# Patient Record
Sex: FEMALE | Race: WHITE | NOT HISPANIC OR LATINO | Employment: UNEMPLOYED | URBAN - METROPOLITAN AREA
[De-identification: names, ages, dates, MRNs, and addresses within clinical notes are randomized per-mention and may not be internally consistent; named-entity substitution may affect disease eponyms.]

---

## 2019-01-30 ENCOUNTER — HOSPITAL ENCOUNTER (OUTPATIENT)
Dept: RADIOLOGY | Facility: MEDICAL CENTER | Age: 59
End: 2019-01-30

## 2019-02-12 ENCOUNTER — HOSPITAL ENCOUNTER (OUTPATIENT)
Dept: RADIOLOGY | Facility: MEDICAL CENTER | Age: 59
End: 2019-02-12
Attending: SURGERY
Payer: MEDICAID

## 2019-02-12 DIAGNOSIS — Z01.812 PRE-OPERATIVE LABORATORY EXAMINATION: ICD-10-CM

## 2019-02-12 DIAGNOSIS — D05.11 DUCTAL CARCINOMA IN SITU (DCIS) OF RIGHT BREAST: ICD-10-CM

## 2019-02-12 DIAGNOSIS — Z01.810 PRE-OPERATIVE CARDIOVASCULAR EXAMINATION: ICD-10-CM

## 2019-02-12 LAB
ALBUMIN SERPL BCP-MCNC: 4.3 G/DL (ref 3.2–4.9)
ALBUMIN/GLOB SERPL: 1.1 G/DL
ALP SERPL-CCNC: 68 U/L (ref 30–99)
ALT SERPL-CCNC: 51 U/L (ref 2–50)
ANION GAP SERPL CALC-SCNC: 7 MMOL/L (ref 0–11.9)
AST SERPL-CCNC: 47 U/L (ref 12–45)
BASOPHILS # BLD AUTO: 0.7 % (ref 0–1.8)
BASOPHILS # BLD: 0.03 K/UL (ref 0–0.12)
BILIRUB SERPL-MCNC: 0.7 MG/DL (ref 0.1–1.5)
BUN SERPL-MCNC: 9 MG/DL (ref 8–22)
CALCIUM SERPL-MCNC: 9.5 MG/DL (ref 8.5–10.5)
CHLORIDE SERPL-SCNC: 105 MMOL/L (ref 96–112)
CO2 SERPL-SCNC: 27 MMOL/L (ref 20–33)
CREAT SERPL-MCNC: 0.7 MG/DL (ref 0.5–1.4)
EOSINOPHIL # BLD AUTO: 0.07 K/UL (ref 0–0.51)
EOSINOPHIL NFR BLD: 1.6 % (ref 0–6.9)
ERYTHROCYTE [DISTWIDTH] IN BLOOD BY AUTOMATED COUNT: 43.6 FL (ref 35.9–50)
GLOBULIN SER CALC-MCNC: 3.8 G/DL (ref 1.9–3.5)
GLUCOSE SERPL-MCNC: 108 MG/DL (ref 65–99)
HCT VFR BLD AUTO: 45.7 % (ref 37–47)
HGB BLD-MCNC: 16.2 G/DL (ref 12–16)
IMM GRANULOCYTES # BLD AUTO: 0.01 K/UL (ref 0–0.11)
IMM GRANULOCYTES NFR BLD AUTO: 0.2 % (ref 0–0.9)
INR PPP: 0.94 (ref 0.87–1.13)
LYMPHOCYTES # BLD AUTO: 2.02 K/UL (ref 1–4.8)
LYMPHOCYTES NFR BLD: 45.2 % (ref 22–41)
MCH RBC QN AUTO: 34.5 PG (ref 27–33)
MCHC RBC AUTO-ENTMCNC: 35.4 G/DL (ref 33.6–35)
MCV RBC AUTO: 97.2 FL (ref 81.4–97.8)
MONOCYTES # BLD AUTO: 0.44 K/UL (ref 0–0.85)
MONOCYTES NFR BLD AUTO: 9.8 % (ref 0–13.4)
NEUTROPHILS # BLD AUTO: 1.9 K/UL (ref 2–7.15)
NEUTROPHILS NFR BLD: 42.5 % (ref 44–72)
NRBC # BLD AUTO: 0 K/UL
NRBC BLD-RTO: 0 /100 WBC
PLATELET # BLD AUTO: 148 K/UL (ref 164–446)
PMV BLD AUTO: 11.4 FL (ref 9–12.9)
POTASSIUM SERPL-SCNC: 4.7 MMOL/L (ref 3.6–5.5)
PROT SERPL-MCNC: 8.1 G/DL (ref 6–8.2)
PROTHROMBIN TIME: 12.7 SEC (ref 12–14.6)
RBC # BLD AUTO: 4.7 M/UL (ref 4.2–5.4)
SODIUM SERPL-SCNC: 139 MMOL/L (ref 135–145)
WBC # BLD AUTO: 4.5 K/UL (ref 4.8–10.8)

## 2019-02-12 PROCEDURE — 85610 PROTHROMBIN TIME: CPT

## 2019-02-12 PROCEDURE — 85025 COMPLETE CBC W/AUTO DIFF WBC: CPT

## 2019-02-12 PROCEDURE — 80053 COMPREHEN METABOLIC PANEL: CPT

## 2019-02-12 PROCEDURE — 36415 COLL VENOUS BLD VENIPUNCTURE: CPT

## 2019-02-12 PROCEDURE — 19281 PERQ DEVICE BREAST 1ST IMAG: CPT | Mod: RT

## 2019-02-12 RX ORDER — VARENICLINE TARTRATE 1 MG/1
1 TABLET, FILM COATED ORAL 2 TIMES DAILY
COMMUNITY

## 2019-02-14 ENCOUNTER — HOSPITAL ENCOUNTER (OUTPATIENT)
Facility: MEDICAL CENTER | Age: 59
End: 2019-02-14
Attending: SURGERY | Admitting: SURGERY
Payer: MEDICAID

## 2019-02-14 ENCOUNTER — APPOINTMENT (OUTPATIENT)
Dept: RADIOLOGY | Facility: MEDICAL CENTER | Age: 59
End: 2019-02-14
Attending: SURGERY
Payer: MEDICAID

## 2019-02-14 VITALS
TEMPERATURE: 98 F | WEIGHT: 185.85 LBS | DIASTOLIC BLOOD PRESSURE: 86 MMHG | SYSTOLIC BLOOD PRESSURE: 133 MMHG | HEART RATE: 82 BPM | RESPIRATION RATE: 16 BRPM | HEIGHT: 64 IN | OXYGEN SATURATION: 92 % | BODY MASS INDEX: 31.73 KG/M2

## 2019-02-14 DIAGNOSIS — D05.11 DUCTAL CARCINOMA IN SITU (DCIS) OF RIGHT BREAST: ICD-10-CM

## 2019-02-14 LAB — PATHOLOGY CONSULT NOTE: NORMAL

## 2019-02-14 PROCEDURE — 700111 HCHG RX REV CODE 636 W/ 250 OVERRIDE (IP)

## 2019-02-14 PROCEDURE — 501445 HCHG STAPLER, SKIN DISP: Performed by: SURGERY

## 2019-02-14 PROCEDURE — A6403 STERILE GAUZE>16 <= 48 SQ IN: HCPCS | Performed by: SURGERY

## 2019-02-14 PROCEDURE — A6402 STERILE GAUZE <= 16 SQ IN: HCPCS | Performed by: SURGERY

## 2019-02-14 PROCEDURE — 160041 HCHG SURGERY MINUTES - EA ADDL 1 MIN LEVEL 4: Performed by: SURGERY

## 2019-02-14 PROCEDURE — 501838 HCHG SUTURE GENERAL: Performed by: SURGERY

## 2019-02-14 PROCEDURE — 700101 HCHG RX REV CODE 250

## 2019-02-14 PROCEDURE — 500447 HCHG DRESSING, TEGADERM 8X12: Performed by: SURGERY

## 2019-02-14 PROCEDURE — 88307 TISSUE EXAM BY PATHOLOGIST: CPT | Mod: 59

## 2019-02-14 PROCEDURE — 160025 RECOVERY II MINUTES (STATS): Performed by: SURGERY

## 2019-02-14 PROCEDURE — 160046 HCHG PACU - 1ST 60 MINS PHASE II: Performed by: SURGERY

## 2019-02-14 PROCEDURE — 160048 HCHG OR STATISTICAL LEVEL 1-5: Performed by: SURGERY

## 2019-02-14 PROCEDURE — 160009 HCHG ANES TIME/MIN: Performed by: SURGERY

## 2019-02-14 PROCEDURE — 160035 HCHG PACU - 1ST 60 MINS PHASE I: Performed by: SURGERY

## 2019-02-14 PROCEDURE — 160002 HCHG RECOVERY MINUTES (STAT): Performed by: SURGERY

## 2019-02-14 PROCEDURE — 88341 IMHCHEM/IMCYTCHM EA ADD ANTB: CPT | Mod: XU

## 2019-02-14 PROCEDURE — 76098 X-RAY EXAM SURGICAL SPECIMEN: CPT | Mod: RT

## 2019-02-14 PROCEDURE — 88342 IMHCHEM/IMCYTCHM 1ST ANTB: CPT | Mod: XU

## 2019-02-14 PROCEDURE — A9270 NON-COVERED ITEM OR SERVICE: HCPCS | Performed by: ANESTHESIOLOGY

## 2019-02-14 PROCEDURE — 700102 HCHG RX REV CODE 250 W/ 637 OVERRIDE(OP): Performed by: ANESTHESIOLOGY

## 2019-02-14 PROCEDURE — 160029 HCHG SURGERY MINUTES - 1ST 30 MINS LEVEL 4: Performed by: SURGERY

## 2019-02-14 PROCEDURE — 160036 HCHG PACU - EA ADDL 30 MINS PHASE I: Performed by: SURGERY

## 2019-02-14 PROCEDURE — 88360 TUMOR IMMUNOHISTOCHEM/MANUAL: CPT | Mod: 91

## 2019-02-14 RX ORDER — HYDROMORPHONE HYDROCHLORIDE 1 MG/ML
0.4 INJECTION, SOLUTION INTRAMUSCULAR; INTRAVENOUS; SUBCUTANEOUS
Status: DISCONTINUED | OUTPATIENT
Start: 2019-02-14 | End: 2019-02-14 | Stop reason: HOSPADM

## 2019-02-14 RX ORDER — SODIUM CHLORIDE, SODIUM LACTATE, POTASSIUM CHLORIDE, CALCIUM CHLORIDE 600; 310; 30; 20 MG/100ML; MG/100ML; MG/100ML; MG/100ML
INJECTION, SOLUTION INTRAVENOUS ONCE
Status: COMPLETED | OUTPATIENT
Start: 2019-02-14 | End: 2019-02-14

## 2019-02-14 RX ORDER — OXYCODONE HCL 5 MG/5 ML
10 SOLUTION, ORAL ORAL
Status: DISCONTINUED | OUTPATIENT
Start: 2019-02-14 | End: 2019-02-14 | Stop reason: HOSPADM

## 2019-02-14 RX ORDER — HYDRALAZINE HYDROCHLORIDE 20 MG/ML
5 INJECTION INTRAMUSCULAR; INTRAVENOUS
Status: DISCONTINUED | OUTPATIENT
Start: 2019-02-14 | End: 2019-02-14 | Stop reason: HOSPADM

## 2019-02-14 RX ORDER — CELECOXIB 200 MG/1
400 CAPSULE ORAL ONCE
Status: COMPLETED | OUTPATIENT
Start: 2019-02-14 | End: 2019-02-14

## 2019-02-14 RX ORDER — GABAPENTIN 300 MG/1
300 CAPSULE ORAL ONCE
Status: COMPLETED | OUTPATIENT
Start: 2019-02-14 | End: 2019-02-14

## 2019-02-14 RX ORDER — BUPIVACAINE HYDROCHLORIDE AND EPINEPHRINE 5; 5 MG/ML; UG/ML
INJECTION, SOLUTION EPIDURAL; INTRACAUDAL; PERINEURAL
Status: DISCONTINUED
Start: 2019-02-14 | End: 2019-02-14 | Stop reason: HOSPADM

## 2019-02-14 RX ORDER — ACETAMINOPHEN 500 MG
1000 TABLET ORAL ONCE
Status: COMPLETED | OUTPATIENT
Start: 2019-02-14 | End: 2019-02-14

## 2019-02-14 RX ORDER — SODIUM CHLORIDE, SODIUM LACTATE, POTASSIUM CHLORIDE, AND CALCIUM CHLORIDE .6; .31; .03; .02 G/100ML; G/100ML; G/100ML; G/100ML
500 INJECTION, SOLUTION INTRAVENOUS ONCE
Status: DISCONTINUED | OUTPATIENT
Start: 2019-02-14 | End: 2019-02-14 | Stop reason: HOSPADM

## 2019-02-14 RX ORDER — HYDROMORPHONE HYDROCHLORIDE 1 MG/ML
0.2 INJECTION, SOLUTION INTRAMUSCULAR; INTRAVENOUS; SUBCUTANEOUS
Status: DISCONTINUED | OUTPATIENT
Start: 2019-02-14 | End: 2019-02-14 | Stop reason: HOSPADM

## 2019-02-14 RX ORDER — HYDROCODONE BITARTRATE AND ACETAMINOPHEN 5; 325 MG/1; MG/1
1-2 TABLET ORAL EVERY 4 HOURS PRN
Qty: 14 TAB | Refills: 0 | Status: SHIPPED | OUTPATIENT
Start: 2019-02-14 | End: 2019-02-17

## 2019-02-14 RX ORDER — SODIUM CHLORIDE, SODIUM LACTATE, POTASSIUM CHLORIDE, CALCIUM CHLORIDE 600; 310; 30; 20 MG/100ML; MG/100ML; MG/100ML; MG/100ML
INJECTION, SOLUTION INTRAVENOUS CONTINUOUS
Status: DISCONTINUED | OUTPATIENT
Start: 2019-02-14 | End: 2019-02-14 | Stop reason: HOSPADM

## 2019-02-14 RX ORDER — BUPIVACAINE HYDROCHLORIDE AND EPINEPHRINE 5; 5 MG/ML; UG/ML
INJECTION, SOLUTION EPIDURAL; INTRACAUDAL; PERINEURAL
Status: DISCONTINUED | OUTPATIENT
Start: 2019-02-14 | End: 2019-02-14 | Stop reason: HOSPADM

## 2019-02-14 RX ORDER — ONDANSETRON 2 MG/ML
4 INJECTION INTRAMUSCULAR; INTRAVENOUS
Status: DISCONTINUED | OUTPATIENT
Start: 2019-02-14 | End: 2019-02-14 | Stop reason: HOSPADM

## 2019-02-14 RX ORDER — OXYCODONE HCL 5 MG/5 ML
5 SOLUTION, ORAL ORAL
Status: DISCONTINUED | OUTPATIENT
Start: 2019-02-14 | End: 2019-02-14 | Stop reason: HOSPADM

## 2019-02-14 RX ORDER — HYDROMORPHONE HYDROCHLORIDE 1 MG/ML
0.1 INJECTION, SOLUTION INTRAMUSCULAR; INTRAVENOUS; SUBCUTANEOUS
Status: DISCONTINUED | OUTPATIENT
Start: 2019-02-14 | End: 2019-02-14 | Stop reason: HOSPADM

## 2019-02-14 RX ORDER — DIPHENHYDRAMINE HYDROCHLORIDE 50 MG/ML
12.5 INJECTION INTRAMUSCULAR; INTRAVENOUS
Status: DISCONTINUED | OUTPATIENT
Start: 2019-02-14 | End: 2019-02-14 | Stop reason: HOSPADM

## 2019-02-14 RX ORDER — MEPERIDINE HYDROCHLORIDE 25 MG/ML
12.5 INJECTION INTRAMUSCULAR; INTRAVENOUS; SUBCUTANEOUS
Status: DISCONTINUED | OUTPATIENT
Start: 2019-02-14 | End: 2019-02-14 | Stop reason: HOSPADM

## 2019-02-14 RX ADMIN — SODIUM CHLORIDE, SODIUM LACTATE, POTASSIUM CHLORIDE, CALCIUM CHLORIDE: 600; 310; 30; 20 INJECTION, SOLUTION INTRAVENOUS at 08:15

## 2019-02-14 RX ADMIN — CELECOXIB 400 MG: 200 CAPSULE ORAL at 08:27

## 2019-02-14 RX ADMIN — ACETAMINOPHEN 1000 MG: 500 TABLET, FILM COATED ORAL at 08:27

## 2019-02-14 RX ADMIN — GABAPENTIN 300 MG: 300 CAPSULE ORAL at 08:27

## 2019-02-14 NOTE — OR SURGEON
Immediate Post OP Note    PreOp Diagnosis: Ductal Carcinoma In Situ of the Right Breast    PostOp Diagnosis: Same    Procedure(s):  RIGHT LUMPECTOMY WITH CLEOPATRA  LOCALIZATION   - Wound Class: Clean    Surgeon(s):  Wilfred Holden M.D.    Assist:  JED Santiago    Anesthesiologist/Type of Anesthesia:  Anesthesiologist: Andrea Farmer M.D./General    Surgical Staff:  Circulator: Marcelle Baeza R.N.; Lisa Mendenhall R.N.  Scrub Person: Conner Jeff    Specimens removed if any:  ID Type Source Tests Collected by Time Destination   A : copy to angel Lr. Right breast lumpectomy. dx breast cancer  Tissue Breast PATHOLOGY SPECIMEN Wilfred Holden M.D. 2/14/2019  9:13 AM    B : right breast lumpectomy- superior- suture marks new margain Tissue Breast PATHOLOGY SPECIMEN Wilfred Holden M.D. 2/14/2019  9:27 AM    C : right breast superior- suture marks new margain Tissue Breast PATHOLOGY SPECIMEN Wilfred Holden M.D. 2/14/2019  9:30 AM    D : right breast inferior- suture marks new margain Tissue Breast PATHOLOGY SPECIMEN Wilfred Holden M.D. 2/14/2019  9:30 AM    E : righ breast deep-suture marks new [place] Tissue Breast PATHOLOGY SPECIMEN Wilfred Holden M.D. 2/14/2019  9:31 AM    F : right breast lateral- suture marks new margain Tissue Breast PATHOLOGY SPECIMEN Wilfred Holden M.D. 2/14/2019  9:31 AM        Estimated Blood Loss: 5 mL    Findings: Breast lump with cleopatra  inside plus additional clip and additional margins excised from right breast in the upper lateral quadrant.     Complications: No apparent complications.        2/14/2019 9:58 AM Wilfred Holden M.D.

## 2019-02-14 NOTE — OR NURSING
0952-Received from OR via [facility]. S/P Right mastectomy. Pressure dressing intact.  Bedside report received from RN. And Anesthesiologist.    1030-sleeping soundly. 02 at 2L. Patient has sleep apnea.     1125- no change. Resting soundly with eyes closed.02 sats drop due to sleep apnea. sats up to 96% in between apnea. Drops down to 84% then up to 96%.    1230-waking up. Water given. Son and daughter in law brought to bedside.02 removed. Will watch 02 sats.    1315-meets discharge criteria. Iv removed. Instructions explained to son and daughter in law.  All questions answered. Discharged home with responsible party. Escorted to car via wheelchair.

## 2019-02-14 NOTE — DISCHARGE INSTRUCTIONS
ACTIVITY: Rest and take it easy for the first 24 hours.  A responsible adult is recommended to remain with you during that time.  It is normal to feel sleepy.  We encourage you to not do anything that requires balance, judgment or coordination.    MILD FLU-LIKE SYMPTOMS ARE NORMAL. YOU MAY EXPERIENCE GENERALIZED MUSCLE ACHES, THROAT IRRITATION, HEADACHE AND/OR SOME NAUSEA.    FOR 24 HOURS DO NOT:  Drive, operate machinery or run household appliances.  Drink beer or alcoholic beverages.   Make important decisions or sign legal documents.    SPECIAL INSTRUCTIONS: keep dressing clean and dry until tomorrow. Leave steri strips in place. They will fall off as you heal. Avoid heavy lifting. Limit upper arm lifting, pushing or pulling.    DIET: To avoid nausea, slowly advance diet as tolerated, avoiding spicy or greasy foods for the first day.  Add more substantial food to your diet according to your physician's instructions.  Babies can be fed formula or breast milk as soon as they are hungry.  INCREASE FLUIDS AND FIBER TO AVOID CONSTIPATION    FOLLOW-UP APPOINTMENT:  A follow-up appointment should be arranged with your doctor in two weeks; call to schedule.    You should CALL YOUR PHYSICIAN if you develop:  Fever greater than 101 degrees F.  Pain not relieved by medication, or persistent nausea or vomiting.  Excessive bleeding (blood soaking through dressing) or unexpected drainage from the wound.  Extreme redness or swelling around the incision site, drainage of pus or foul smelling drainage.  Inability to urinate or empty your bladder within 8 hours.  Problems with breathing or chest pain.    You should call 911 if you develop problems with breathing or chest pain.  If you are unable to contact your doctor or surgical center, you should go to the nearest emergency room or urgent care center.  Physician's telephone #: [phone]    If any questions arise, call your doctor.  If your doctor is not available, please  feel free to call the Surgical Center at [phone].  The Center is open Monday through Friday from 7AM to 7PM.  You can also call the HEALTH HOTLINE open 24 hours/day, 7 days/week and speak to a nurse at [phone], or toll free at [phone].    A registered nurse may call you a few days after your surgery to see how you are doing after your procedure.    MEDICATIONS: Resume taking daily medication.  Take prescribed pain medication with food.  If no medication is prescribed, you may take non-aspirin pain medication if needed.  PAIN MEDICATION CAN BE VERY CONSTIPATING.  Take a stool softener or laxative such as senokot, pericolace, or milk of magnesia if needed.    Prescription given for NORCO.  Last pain medication given at _______    If your physician has prescribed pain medication that includes Acetaminophen (Tylenol), do not take additional Acetaminophen (Tylenol) while taking the prescribed medication.    Depression / Suicide Risk    As you are discharged from this [facility] facility, it is important to learn how to keep safe from harming yourself.    Recognize the warning signs:  · Abrupt changes in personality, positive or negative- including increase in energy   · Giving away possessions  · Change in eating patterns- significant weight changes-  positive or negative  · Change in sleeping patterns- unable to sleep or sleeping all the time   · Unwillingness or inability to communicate  · Depression  · Unusual sadness, discouragement and loneliness  · Talk of wanting to die  · Neglect of personal appearance   · Rebelliousness- reckless behavior  · Withdrawal from people/activities they love  · Confusion- inability to concentrate     If you or a loved one observes any of these behaviors or has concerns about self-harm, here's what you can do:  · Talk about it- your feelings and reasons for harming yourself  · Remove any means that you might use to hurt yourself (examples: pills, rope, extension  cords, firearm)  · Get professional help from the community (Mental Health, Substance Abuse, psychological counseling)  · Do not be alone:Call your Safe Contact- someone whom you trust who will be there for you.  · Call your local CRISIS HOTLINE 658-3638 or 905-168-0610  · Call your local Children's Mobile Crisis Response Team [place] (646) 065-9635 or www.[employer]  · Call the toll free National Suicide Prevention Hotlines   · National Suicide Prevention Lifeline 429-821-ZNXV (9575)  · National Hope Line Network 800-SUICIDE (442-1671)

## 2019-02-20 NOTE — OP REPORT
DATE OF SERVICE:  02/14/2019    SURGEON PRESENT:  Wilfred Holden MD    ANESTHESIOLOGIST:  Andrea Farmer MD    TYPE OF ANESTHETIC:  General anesthesia using laryngeal mask airway plus local   0.5% Marcaine with epinephrine.    PREOPERATIVE DIAGNOSIS:  Right breast ductal carcinoma in situ.    POSTOPERATIVE DIAGNOSIS:  Right breast ductal carcinoma in situ.    PROCEDURE:  Right partial mastectomy.    FINDINGS:  The patient is a 58-year-old female who has been referred from the   office of Dr. Lr for an abnormal screening mammogram and then diagnostic   mammogram in 12/2018.  This revealed a cluster of microcalcifications in the   upper outer quadrant of the right breast.  Biopsy of this area revealed   histopathology consistent with ductal carcinoma in situ, no invasive cancer   was found.  Options were discussed with the patient and she elected to proceed   with a partial mastectomy on the right side to be followed by radiation   therapy.  This was performed today without apparent complication.    FINDINGS AND PROCEDURE:  The patient was brought to the operating room and   placed on table in supine position.  General anesthesia was then provided by   the anesthesiologist.  The right breast area was then prepped and draped in   usual sterile fashion.  Time-out was called.  The correct patient, correct   diagnosis, correct surgery, and correct location of the surgery were discussed   and agreed upon.  The patient had a Pamela  placed prior to the surgery   with the Pamela  in the area of the previous biopsy close to the biopsy   clip.  Films were reviewed to confirm adequate and proper location.  The Pamela    device to locate the transistor, was then utilized to choose the area   for the incision.  An elliptical incision made directly over this area with   #15 blade through the skin and subcutaneous tissue.  This was done in the   upper outer quadrant of the right breast.  Dissection was then  carried down   into the subcutaneous tissue using the Pamela  location device.  A 1 cm   margin was obtained around the Pamela  lesion and extended out several   centimeters as the lesion was approached.  Entire specimen was removed.  The   specimen radiograph revealed the complications and the clip and the transistor   were all within the resected specimen.  This was sent to pathology after it   was oriented for further evaluation.  Additional margins were then taken.    Additional superior, inferior, medial, lateral and deep margin were taken and   the new margins were marked with sutures, each margins about 2x2x1 cm in size.    The cavity there was left was then irrigated and then injected with 0.5%   Marcaine with epinephrine.  The breast tissue was then reapproximated loosely   with 3-0 Vicryl suture.  The dermis was closed using running 3-0 Vicryl   suture.  The skin was closed using running 4-0 Monocryl suture in a   subcuticular fashion.  Steri-Strips and sterile dressing were applied.  An Ace   wrap was applied.  Again, the patient tolerated well and was transferred back   to recovery room for further postoperative care.       ____________________________________     MD ARETHA PORTILLOH / NTS    DD:  02/19/2019 21:36:31  DT:  02/20/2019 00:31:08    D#:  [id number]  Job#:  [id number]    cc: Shannan Lr MD

## 2019-03-11 ENCOUNTER — HOSPITAL ENCOUNTER (OUTPATIENT)
Dept: RADIATION ONCOLOGY | Facility: MEDICAL CENTER | Age: 59
End: 2019-03-31
Attending: RADIOLOGY
Payer: MEDICAID

## 2019-03-11 VITALS
SYSTOLIC BLOOD PRESSURE: 124 MMHG | HEIGHT: 64 IN | WEIGHT: 185.8 LBS | BODY MASS INDEX: 31.72 KG/M2 | HEART RATE: 72 BPM | TEMPERATURE: 98.2 F | OXYGEN SATURATION: 97 % | DIASTOLIC BLOOD PRESSURE: 62 MMHG

## 2019-03-11 PROCEDURE — 99214 OFFICE O/P EST MOD 30 MIN: CPT | Performed by: RADIOLOGY

## 2019-03-11 PROCEDURE — 99205 OFFICE O/P NEW HI 60 MIN: CPT | Performed by: RADIOLOGY

## 2019-03-11 ASSESSMENT — PAIN SCALES - GENERAL: PAINLEVEL: NO PAIN

## 2019-03-11 NOTE — CONSULTS
RADIATION ONCOLOGY CONSULT    DATE OF SERVICE: 3/11/2019    IDENTIFICATION: A 59 y.o. female with intermediate grade DCIS status post lumpectomy with negative margins..  She is here at the kind request of Dr. Carlos Holden.      HISTORY OF PRESENT ILLNESS: Patient's history dates back to 2018 when on routine mammogram she was noted to have microcalcifications in the upper outer quadrant of the right breast.  She then underwent a diagnostic mammogram and ultrasound on 2018.  This confirms a worrisome calcifications and on ultrasound the abnormality measured 12 x 2.5 mm.  She then underwent a biopsy on 1/3/2019 and this revealed a solid intermediate grade DCIS with comedonecrosis.  She subsequently underwent an excisional biopsy 2019 this confirmed the intermediate grade ductal carcinoma in situ 1.8 cm in greatest dimension margins were negative the closest margin was a 2 mm superior margin.  Tumor was estrogen positive at  postoperatively she has been doing well she is seen in consultation about the role of radiation therapy to decrease her chance of local its    PAST MEDICAL HISTORY:   Past Medical History:   Diagnosis Date   • Arthritis    • Cancer (HCC) 2019    breast cancer   • Heart burn    • Pain     joints feet   • Psychiatric problem     depression   • Snoring        PAST SURGICAL HISTORY:  Past Surgical History:   Procedure Laterality Date   • MASTECTOMY Right 2019    Procedure: MASTECTOMY- BREAST  ;  Surgeon: Wilfred Holden M.D.;  Location: SURGERY SAME DAY [facility];  Service: General   • LUMPECTOMY Right 2019    Procedure: LUMPECTOMY WITH CELESTE  LOCALIZATION  ;  Surgeon: Wilfred Holden M.D.;  Location: SURGERY SAME DAY [facility];  Service: General   • APPENDECTOMY     • CHOLECYSTECTOMY     • OTHER      bx of thyroid mass       GYNECOLOGICAL STATUS:     & Para:   : 2, Para: 1, Number of Interrupted Pregnancies: 1 and Age at first birth:  "19    Menopause Status: Post    Gynecological comments: BCP:2 years                                             HRT: 0 years            CURRENT MEDICATIONS:  Current Outpatient Prescriptions   Medication Sig Dispense Refill   • varenicline (CHANTIX) 1 MG tablet Take 1 mg by mouth 2 times a day.     • Acetaminophen (TYLENOL PO) Take  by mouth as needed.     • Ibuprofen (ADVIL PO) Take  by mouth as needed.     • RaNITidine HCl (ZANTAC PO) Take  by mouth as needed.       No current facility-administered medications for this encounter.        ALLERGIES:    Patient has no known allergies.    FAMILY HISTORY:    Father - pancreatic cancer, brother - liver cancer.        SOCIAL HISTORY:     reports that she has been smoking Cigarettes.  She has a 25.00 pack-year smoking history. She has never used smokeless tobacco. She reports that she drinks alcohol. She reports that she does not use drugs.   Patient is , has 1 child and lives in [place], [place]. Patient is a self-employed .      REVIEW OF SYSTEMS: Pertinent positives consist of a little bit of pain in the resected spot on the right breast.,  Fatigue, nocturia, joint pain, neuropathy in her hands but it gets better after she sees the chiropractor.  The rest of the review of systems is negative and has been reviewed by me. It is in the nursing note dated 3/11/2019 in [facility]    Pain Scale: 0-10  Pain Assessement: 0  Pain Location, Orientation and Scale: no complaints of pain.     PHYSICAL EXAM:    0= Fully active, able to carry on all pre-disease performance without restriction.  Vitals:    03/11/19 1454   BP: 124/62   Pulse: 72   Temp: 36.8 °C (98.2 °F)   SpO2: 97%   Weight: 84.3 kg (185 lb 12.8 oz)   Height: 1.626 m (5' 4\")            GENERAL: Well-appearing alert and oriented x3 in no apparent distress  Breasts: Bilaterally symmetrical they have dense fibroglandular changes and no discrete mass.  The right breast has evidence of the lumpectomy site in the upper " outer quadrant with some minimal induration beneath.  No discrete masses are appreciated in either breast or axilla.  HEENT:  Pupils are equal, round, and reactive to light.  Extraocular muscles   are intact. Sclerae nonicteric.  Conjunctivae pink.  Oral cavity, tongue   protrudes midline.   NECK:   No peripheral adenopathy of the neck, supraclavicular fossa or axillae   bilaterally.  LUNGS:  Clear to ascultation   HEART:  Regular rate and rhythm.  No murmur appreciated  ABDOMEN:  Soft. No evidence of hepatosplenomegaly.    EXTREMITIES:  Without Edema.  NEUROLOGIC:  Cranial nerves II through XII were intact. Normal stance and gait motor and sensory grossly within normal limits                IMPRESSION:    A 59 y.o. with  intermediate grade DCIS status post lumpectomy with negative margins...      RECOMMENDATIONS:   Patient understands and would like to proceed with radiation therapy.  I've described the details of radiation along with the side effects both acute and chronic, including but not exclusive to fatigue, skin reaction, local soreness, swelling, delayed healing, scarring fibrosis discoloration. Ample time was allowed for questions, and patient understands.    I have her tentatively scheduled for simulation tomorrow to get started soon thereafter.  I also instructed her to call  to talk about systemic management as well    Thank you for the opportunity to participate in her care.  If any questions or comments, please do not hesitate in calling.    Please note that this dictation was created using voice recognition software. I have made every reasonable attempt to correct obvious errors, but I expect that there are errors of grammar and possibly content that I did not discover before finalizing the note.

## 2019-03-11 NOTE — NON-PROVIDER
"Patient was seen today in clinic with Dr. Bruton for Breast Cancer.  Vitals signs and weight were obtained and pain assessment was completed.  Allergies and medications were reviewed with the patient.  Review of systems completed.     Vitals/Pain:  Vitals:    03/11/19 1454   BP: 124/62   Pulse: 72   Temp: 36.8 °C (98.2 °F)   SpO2: 97%   Weight: 84.3 kg (185 lb 12.8 oz)   Height: 1.626 m (5' 4\")   Pain Score: No pain        Allergies:   Patient has no known allergies.    Current Medications:  Current Outpatient Prescriptions   Medication Sig Dispense Refill   • varenicline (CHANTIX) 1 MG tablet Take 1 mg by mouth 2 times a day.     • Acetaminophen (TYLENOL PO) Take  by mouth as needed.     • Ibuprofen (ADVIL PO) Take  by mouth as needed.     • RaNITidine HCl (ZANTAC PO) Take  by mouth as needed.       No current facility-administered medications for this encounter.          PCP:  Adeline Schneider R.N.  "

## 2019-03-12 ENCOUNTER — HOSPITAL ENCOUNTER (OUTPATIENT)
Dept: RADIATION ONCOLOGY | Facility: MEDICAL CENTER | Age: 59
End: 2019-03-12

## 2019-03-12 PROCEDURE — 77290 THER RAD SIMULAJ FIELD CPLX: CPT | Mod: 26 | Performed by: RADIOLOGY

## 2019-03-12 PROCEDURE — 77334 RADIATION TREATMENT AID(S): CPT | Mod: 26 | Performed by: RADIOLOGY

## 2019-03-12 PROCEDURE — 77290 THER RAD SIMULAJ FIELD CPLX: CPT | Performed by: RADIOLOGY

## 2019-03-12 PROCEDURE — 77263 THER RADIOLOGY TX PLNG CPLX: CPT | Performed by: RADIOLOGY

## 2019-03-12 PROCEDURE — 77334 RADIATION TREATMENT AID(S): CPT | Performed by: RADIOLOGY

## 2019-03-20 PROCEDURE — 77334 RADIATION TREATMENT AID(S): CPT | Performed by: RADIOLOGY

## 2019-03-20 PROCEDURE — 77300 RADIATION THERAPY DOSE PLAN: CPT | Mod: 26 | Performed by: RADIOLOGY

## 2019-03-20 PROCEDURE — 77334 RADIATION TREATMENT AID(S): CPT | Mod: 26 | Performed by: RADIOLOGY

## 2019-03-20 PROCEDURE — 77300 RADIATION THERAPY DOSE PLAN: CPT | Performed by: RADIOLOGY

## 2019-03-20 PROCEDURE — 77295 3-D RADIOTHERAPY PLAN: CPT | Mod: 26 | Performed by: RADIOLOGY

## 2019-03-20 PROCEDURE — 77295 3-D RADIOTHERAPY PLAN: CPT | Performed by: RADIOLOGY

## 2019-03-25 ENCOUNTER — HOSPITAL ENCOUNTER (OUTPATIENT)
Dept: RADIATION ONCOLOGY | Facility: MEDICAL CENTER | Age: 59
End: 2019-03-25

## 2019-03-25 LAB
CHEMOTHERAPY INFUSION START DATE: NORMAL
CHEMOTHERAPY RECORDS: 2.67
CHEMOTHERAPY RECORDS: 4005
CHEMOTHERAPY RECORDS: NORMAL
CHEMOTHERAPY RX CANCER: NORMAL
RAD ONC ARIA COURSE TREATMENT ELAPSED DAYS: NORMAL
RAD ONC ARIA PLAN TREATMENT DATES: NORMAL
RAD ONC ARIA REFERENCE POINT DOSAGE GIVEN TO DATE: 2.67
RAD ONC ARIA REFERENCE POINT DOSAGE GIVEN TO DATE: 2.67
RAD ONC ARIA REFERENCE POINT ID: NORMAL
RAD ONC ARIA REFERENCE POINT ID: NORMAL
RAD ONC ARIA REFERENCE POINT SESSION DOSAGE GIVEN: 2.67
RAD ONC ARIA REFERENCE POINT SESSION DOSAGE GIVEN: 2.67

## 2019-03-25 PROCEDURE — 77412 RADIATION TX DELIVERY LVL 3: CPT | Performed by: RADIOLOGY

## 2019-03-25 PROCEDURE — 77280 THER RAD SIMULAJ FIELD SMPL: CPT | Mod: 26 | Performed by: RADIOLOGY

## 2019-03-25 PROCEDURE — 77280 THER RAD SIMULAJ FIELD SMPL: CPT | Performed by: RADIOLOGY

## 2019-03-25 PROCEDURE — 77417 THER RADIOLOGY PORT IMAGE(S): CPT | Performed by: RADIOLOGY

## 2019-03-26 ENCOUNTER — HOSPITAL ENCOUNTER (OUTPATIENT)
Dept: RADIATION ONCOLOGY | Facility: MEDICAL CENTER | Age: 59
End: 2019-03-26

## 2019-03-26 LAB
CHEMOTHERAPY INFUSION START DATE: NORMAL
CHEMOTHERAPY RECORDS: 2.67
CHEMOTHERAPY RECORDS: 4005
CHEMOTHERAPY RECORDS: NORMAL
CHEMOTHERAPY RX CANCER: NORMAL
RAD ONC ARIA COURSE TREATMENT ELAPSED DAYS: NORMAL
RAD ONC ARIA PLAN TREATMENT DATES: NORMAL
RAD ONC ARIA REFERENCE POINT DOSAGE GIVEN TO DATE: 5.34
RAD ONC ARIA REFERENCE POINT DOSAGE GIVEN TO DATE: 5.34
RAD ONC ARIA REFERENCE POINT ID: NORMAL
RAD ONC ARIA REFERENCE POINT ID: NORMAL
RAD ONC ARIA REFERENCE POINT SESSION DOSAGE GIVEN: 2.67
RAD ONC ARIA REFERENCE POINT SESSION DOSAGE GIVEN: 2.67

## 2019-03-26 PROCEDURE — 77412 RADIATION TX DELIVERY LVL 3: CPT | Performed by: RADIOLOGY

## 2019-03-27 ENCOUNTER — HOSPITAL ENCOUNTER (OUTPATIENT)
Dept: RADIATION ONCOLOGY | Facility: MEDICAL CENTER | Age: 59
End: 2019-03-27

## 2019-03-27 LAB
CHEMOTHERAPY INFUSION START DATE: NORMAL
CHEMOTHERAPY RECORDS: 2.67
CHEMOTHERAPY RECORDS: 4005
CHEMOTHERAPY RECORDS: NORMAL
CHEMOTHERAPY RX CANCER: NORMAL
RAD ONC ARIA COURSE TREATMENT ELAPSED DAYS: NORMAL
RAD ONC ARIA PLAN TREATMENT DATES: NORMAL
RAD ONC ARIA REFERENCE POINT DOSAGE GIVEN TO DATE: 8.01
RAD ONC ARIA REFERENCE POINT DOSAGE GIVEN TO DATE: 8.01
RAD ONC ARIA REFERENCE POINT ID: NORMAL
RAD ONC ARIA REFERENCE POINT ID: NORMAL
RAD ONC ARIA REFERENCE POINT SESSION DOSAGE GIVEN: 2.67
RAD ONC ARIA REFERENCE POINT SESSION DOSAGE GIVEN: 2.67

## 2019-03-27 PROCEDURE — 77412 RADIATION TX DELIVERY LVL 3: CPT | Performed by: RADIOLOGY

## 2019-03-27 PROCEDURE — 77336 RADIATION PHYSICS CONSULT: CPT | Performed by: RADIOLOGY

## 2019-03-28 ENCOUNTER — HOSPITAL ENCOUNTER (OUTPATIENT)
Dept: RADIATION ONCOLOGY | Facility: MEDICAL CENTER | Age: 59
End: 2019-03-28

## 2019-03-28 LAB
CHEMOTHERAPY INFUSION START DATE: NORMAL
CHEMOTHERAPY RECORDS: 2.67
CHEMOTHERAPY RECORDS: 4005
CHEMOTHERAPY RECORDS: NORMAL
CHEMOTHERAPY RX CANCER: NORMAL
RAD ONC ARIA COURSE TREATMENT ELAPSED DAYS: NORMAL
RAD ONC ARIA PLAN TREATMENT DATES: NORMAL
RAD ONC ARIA REFERENCE POINT DOSAGE GIVEN TO DATE: 10.68
RAD ONC ARIA REFERENCE POINT DOSAGE GIVEN TO DATE: 10.68
RAD ONC ARIA REFERENCE POINT ID: NORMAL
RAD ONC ARIA REFERENCE POINT ID: NORMAL
RAD ONC ARIA REFERENCE POINT SESSION DOSAGE GIVEN: 2.67
RAD ONC ARIA REFERENCE POINT SESSION DOSAGE GIVEN: 2.67

## 2019-03-28 PROCEDURE — 77412 RADIATION TX DELIVERY LVL 3: CPT | Performed by: RADIOLOGY

## 2019-03-29 ENCOUNTER — HOSPITAL ENCOUNTER (OUTPATIENT)
Dept: RADIATION ONCOLOGY | Facility: MEDICAL CENTER | Age: 59
End: 2019-03-29

## 2019-03-29 LAB
CHEMOTHERAPY INFUSION START DATE: NORMAL
CHEMOTHERAPY RECORDS: 2.67
CHEMOTHERAPY RECORDS: 4005
CHEMOTHERAPY RECORDS: NORMAL
CHEMOTHERAPY RX CANCER: NORMAL
RAD ONC ARIA COURSE TREATMENT ELAPSED DAYS: NORMAL
RAD ONC ARIA PLAN TREATMENT DATES: NORMAL
RAD ONC ARIA REFERENCE POINT DOSAGE GIVEN TO DATE: 13.35
RAD ONC ARIA REFERENCE POINT DOSAGE GIVEN TO DATE: 13.35
RAD ONC ARIA REFERENCE POINT ID: NORMAL
RAD ONC ARIA REFERENCE POINT ID: NORMAL
RAD ONC ARIA REFERENCE POINT SESSION DOSAGE GIVEN: 2.67
RAD ONC ARIA REFERENCE POINT SESSION DOSAGE GIVEN: 2.67

## 2019-03-29 PROCEDURE — 77427 RADIATION TX MANAGEMENT X5: CPT | Performed by: RADIOLOGY

## 2019-03-29 PROCEDURE — 77412 RADIATION TX DELIVERY LVL 3: CPT | Performed by: RADIOLOGY

## 2019-04-01 ENCOUNTER — HOSPITAL ENCOUNTER (OUTPATIENT)
Dept: RADIATION ONCOLOGY | Facility: MEDICAL CENTER | Age: 59
End: 2019-04-30
Attending: RADIOLOGY
Payer: MEDICAID

## 2019-04-01 ENCOUNTER — HOSPITAL ENCOUNTER (OUTPATIENT)
Dept: RADIATION ONCOLOGY | Facility: MEDICAL CENTER | Age: 59
End: 2019-04-01

## 2019-04-01 LAB
CHEMOTHERAPY INFUSION START DATE: NORMAL
CHEMOTHERAPY RECORDS: 2.67
CHEMOTHERAPY RECORDS: 4005
CHEMOTHERAPY RECORDS: NORMAL
CHEMOTHERAPY RX CANCER: NORMAL
RAD ONC ARIA COURSE TREATMENT ELAPSED DAYS: NORMAL
RAD ONC ARIA PLAN TREATMENT DATES: NORMAL
RAD ONC ARIA REFERENCE POINT DOSAGE GIVEN TO DATE: 16.02
RAD ONC ARIA REFERENCE POINT DOSAGE GIVEN TO DATE: 16.02
RAD ONC ARIA REFERENCE POINT ID: NORMAL
RAD ONC ARIA REFERENCE POINT ID: NORMAL
RAD ONC ARIA REFERENCE POINT SESSION DOSAGE GIVEN: 2.67
RAD ONC ARIA REFERENCE POINT SESSION DOSAGE GIVEN: 2.67

## 2019-04-01 PROCEDURE — 77412 RADIATION TX DELIVERY LVL 3: CPT | Performed by: RADIOLOGY

## 2019-04-01 PROCEDURE — 77417 THER RADIOLOGY PORT IMAGE(S): CPT | Performed by: RADIOLOGY

## 2019-04-02 ENCOUNTER — HOSPITAL ENCOUNTER (OUTPATIENT)
Dept: RADIATION ONCOLOGY | Facility: MEDICAL CENTER | Age: 59
End: 2019-04-02

## 2019-04-02 LAB
CHEMOTHERAPY INFUSION START DATE: NORMAL
CHEMOTHERAPY RECORDS: 2.67
CHEMOTHERAPY RECORDS: 4005
CHEMOTHERAPY RECORDS: NORMAL
CHEMOTHERAPY RX CANCER: NORMAL
RAD ONC ARIA COURSE TREATMENT ELAPSED DAYS: NORMAL
RAD ONC ARIA PLAN TREATMENT DATES: NORMAL
RAD ONC ARIA REFERENCE POINT DOSAGE GIVEN TO DATE: 18.69
RAD ONC ARIA REFERENCE POINT DOSAGE GIVEN TO DATE: 18.69
RAD ONC ARIA REFERENCE POINT ID: NORMAL
RAD ONC ARIA REFERENCE POINT ID: NORMAL
RAD ONC ARIA REFERENCE POINT SESSION DOSAGE GIVEN: 2.67
RAD ONC ARIA REFERENCE POINT SESSION DOSAGE GIVEN: 2.67

## 2019-04-02 PROCEDURE — 77412 RADIATION TX DELIVERY LVL 3: CPT | Performed by: RADIOLOGY

## 2019-04-03 ENCOUNTER — HOSPITAL ENCOUNTER (OUTPATIENT)
Dept: RADIATION ONCOLOGY | Facility: MEDICAL CENTER | Age: 59
End: 2019-04-03

## 2019-04-03 LAB
CHEMOTHERAPY INFUSION START DATE: NORMAL
CHEMOTHERAPY RECORDS: 2.67
CHEMOTHERAPY RECORDS: 4005
CHEMOTHERAPY RECORDS: NORMAL
CHEMOTHERAPY RX CANCER: NORMAL
RAD ONC ARIA COURSE TREATMENT ELAPSED DAYS: NORMAL
RAD ONC ARIA PLAN TREATMENT DATES: NORMAL
RAD ONC ARIA REFERENCE POINT DOSAGE GIVEN TO DATE: 21.36
RAD ONC ARIA REFERENCE POINT DOSAGE GIVEN TO DATE: 21.36
RAD ONC ARIA REFERENCE POINT ID: NORMAL
RAD ONC ARIA REFERENCE POINT ID: NORMAL
RAD ONC ARIA REFERENCE POINT SESSION DOSAGE GIVEN: 2.67
RAD ONC ARIA REFERENCE POINT SESSION DOSAGE GIVEN: 2.67

## 2019-04-03 PROCEDURE — 77336 RADIATION PHYSICS CONSULT: CPT | Performed by: RADIOLOGY

## 2019-04-03 PROCEDURE — 77412 RADIATION TX DELIVERY LVL 3: CPT | Performed by: RADIOLOGY

## 2019-04-04 ENCOUNTER — HOSPITAL ENCOUNTER (OUTPATIENT)
Dept: RADIATION ONCOLOGY | Facility: MEDICAL CENTER | Age: 59
End: 2019-04-04

## 2019-04-04 LAB
CHEMOTHERAPY INFUSION START DATE: NORMAL
CHEMOTHERAPY RECORDS: 2.67
CHEMOTHERAPY RECORDS: 4005
CHEMOTHERAPY RECORDS: NORMAL
CHEMOTHERAPY RX CANCER: NORMAL
RAD ONC ARIA COURSE TREATMENT ELAPSED DAYS: NORMAL
RAD ONC ARIA PLAN TREATMENT DATES: NORMAL
RAD ONC ARIA REFERENCE POINT DOSAGE GIVEN TO DATE: 24.03
RAD ONC ARIA REFERENCE POINT DOSAGE GIVEN TO DATE: 24.03
RAD ONC ARIA REFERENCE POINT ID: NORMAL
RAD ONC ARIA REFERENCE POINT ID: NORMAL
RAD ONC ARIA REFERENCE POINT SESSION DOSAGE GIVEN: 2.67
RAD ONC ARIA REFERENCE POINT SESSION DOSAGE GIVEN: 2.67

## 2019-04-04 PROCEDURE — 77412 RADIATION TX DELIVERY LVL 3: CPT | Performed by: RADIOLOGY

## 2019-04-05 ENCOUNTER — HOSPITAL ENCOUNTER (OUTPATIENT)
Dept: RADIATION ONCOLOGY | Facility: MEDICAL CENTER | Age: 59
End: 2019-04-05

## 2019-04-05 LAB
CHEMOTHERAPY INFUSION START DATE: NORMAL
CHEMOTHERAPY RECORDS: 2.67
CHEMOTHERAPY RECORDS: 4005
CHEMOTHERAPY RECORDS: NORMAL
CHEMOTHERAPY RX CANCER: NORMAL
RAD ONC ARIA COURSE TREATMENT ELAPSED DAYS: NORMAL
RAD ONC ARIA PLAN TREATMENT DATES: NORMAL
RAD ONC ARIA REFERENCE POINT DOSAGE GIVEN TO DATE: 26.7
RAD ONC ARIA REFERENCE POINT DOSAGE GIVEN TO DATE: 26.7
RAD ONC ARIA REFERENCE POINT ID: NORMAL
RAD ONC ARIA REFERENCE POINT ID: NORMAL
RAD ONC ARIA REFERENCE POINT SESSION DOSAGE GIVEN: 2.67
RAD ONC ARIA REFERENCE POINT SESSION DOSAGE GIVEN: 2.67

## 2019-04-05 PROCEDURE — 77412 RADIATION TX DELIVERY LVL 3: CPT | Performed by: RADIOLOGY

## 2019-04-05 PROCEDURE — 77427 RADIATION TX MANAGEMENT X5: CPT | Performed by: RADIOLOGY

## 2019-04-08 ENCOUNTER — HOSPITAL ENCOUNTER (OUTPATIENT)
Dept: RADIATION ONCOLOGY | Facility: MEDICAL CENTER | Age: 59
End: 2019-04-08

## 2019-04-08 LAB
CHEMOTHERAPY INFUSION START DATE: NORMAL
CHEMOTHERAPY RECORDS: 2.67
CHEMOTHERAPY RECORDS: 4005
CHEMOTHERAPY RECORDS: NORMAL
CHEMOTHERAPY RX CANCER: NORMAL
RAD ONC ARIA COURSE TREATMENT ELAPSED DAYS: NORMAL
RAD ONC ARIA PLAN TREATMENT DATES: NORMAL
RAD ONC ARIA REFERENCE POINT DOSAGE GIVEN TO DATE: 29.37
RAD ONC ARIA REFERENCE POINT DOSAGE GIVEN TO DATE: 29.37
RAD ONC ARIA REFERENCE POINT ID: NORMAL
RAD ONC ARIA REFERENCE POINT ID: NORMAL
RAD ONC ARIA REFERENCE POINT SESSION DOSAGE GIVEN: 2.67
RAD ONC ARIA REFERENCE POINT SESSION DOSAGE GIVEN: 2.67

## 2019-04-08 PROCEDURE — 77412 RADIATION TX DELIVERY LVL 3: CPT | Performed by: RADIOLOGY

## 2019-04-08 PROCEDURE — 77417 THER RADIOLOGY PORT IMAGE(S): CPT | Performed by: RADIOLOGY

## 2019-04-09 ENCOUNTER — HOSPITAL ENCOUNTER (OUTPATIENT)
Facility: MEDICAL CENTER | Age: 59
End: 2019-04-09
Attending: INTERNAL MEDICINE
Payer: MEDICAID

## 2019-04-09 ENCOUNTER — OFFICE VISIT (OUTPATIENT)
Dept: HEMATOLOGY ONCOLOGY | Facility: MEDICAL CENTER | Age: 59
End: 2019-04-09
Payer: MEDICAID

## 2019-04-09 ENCOUNTER — NON-PROVIDER VISIT (OUTPATIENT)
Dept: HEMATOLOGY ONCOLOGY | Facility: MEDICAL CENTER | Age: 59
End: 2019-04-09
Payer: MEDICAID

## 2019-04-09 ENCOUNTER — HOSPITAL ENCOUNTER (OUTPATIENT)
Dept: RADIATION ONCOLOGY | Facility: MEDICAL CENTER | Age: 59
End: 2019-04-09

## 2019-04-09 VITALS
SYSTOLIC BLOOD PRESSURE: 128 MMHG | HEIGHT: 63 IN | BODY MASS INDEX: 33.05 KG/M2 | HEART RATE: 80 BPM | DIASTOLIC BLOOD PRESSURE: 76 MMHG | OXYGEN SATURATION: 94 % | WEIGHT: 186.51 LBS | RESPIRATION RATE: 16 BRPM | TEMPERATURE: 99.5 F

## 2019-04-09 VITALS
SYSTOLIC BLOOD PRESSURE: 128 MMHG | TEMPERATURE: 99.5 F | OXYGEN SATURATION: 94 % | HEART RATE: 80 BPM | HEIGHT: 63 IN | DIASTOLIC BLOOD PRESSURE: 76 MMHG | RESPIRATION RATE: 16 BRPM | WEIGHT: 186 LBS | BODY MASS INDEX: 32.96 KG/M2

## 2019-04-09 DIAGNOSIS — D05.11 DUCTAL CARCINOMA IN SITU (DCIS) OF RIGHT BREAST: ICD-10-CM

## 2019-04-09 LAB
ALBUMIN SERPL BCP-MCNC: 4.1 G/DL (ref 3.2–4.9)
ALBUMIN/GLOB SERPL: 1.1 G/DL
ALP SERPL-CCNC: 63 U/L (ref 30–99)
ALT SERPL-CCNC: 57 U/L (ref 2–50)
ANION GAP SERPL CALC-SCNC: 9 MMOL/L (ref 0–11.9)
AST SERPL-CCNC: 52 U/L (ref 12–45)
BILIRUB SERPL-MCNC: 0.6 MG/DL (ref 0.1–1.5)
BUN SERPL-MCNC: 22 MG/DL (ref 8–22)
CALCIUM SERPL-MCNC: 9.2 MG/DL (ref 8.5–10.5)
CHEMOTHERAPY INFUSION START DATE: NORMAL
CHEMOTHERAPY RECORDS: 2.67
CHEMOTHERAPY RECORDS: 4005
CHEMOTHERAPY RECORDS: NORMAL
CHEMOTHERAPY RX CANCER: NORMAL
CHLORIDE SERPL-SCNC: 108 MMOL/L (ref 96–112)
CO2 SERPL-SCNC: 22 MMOL/L (ref 20–33)
CREAT SERPL-MCNC: 0.73 MG/DL (ref 0.5–1.4)
GLOBULIN SER CALC-MCNC: 3.8 G/DL (ref 1.9–3.5)
GLUCOSE SERPL-MCNC: 87 MG/DL (ref 65–99)
POTASSIUM SERPL-SCNC: 4.2 MMOL/L (ref 3.6–5.5)
PROT SERPL-MCNC: 7.9 G/DL (ref 6–8.2)
RAD ONC ARIA COURSE TREATMENT ELAPSED DAYS: NORMAL
RAD ONC ARIA PLAN TREATMENT DATES: NORMAL
RAD ONC ARIA REFERENCE POINT DOSAGE GIVEN TO DATE: 32.04
RAD ONC ARIA REFERENCE POINT DOSAGE GIVEN TO DATE: 32.04
RAD ONC ARIA REFERENCE POINT ID: NORMAL
RAD ONC ARIA REFERENCE POINT ID: NORMAL
RAD ONC ARIA REFERENCE POINT SESSION DOSAGE GIVEN: 2.67
RAD ONC ARIA REFERENCE POINT SESSION DOSAGE GIVEN: 2.67
SODIUM SERPL-SCNC: 139 MMOL/L (ref 135–145)

## 2019-04-09 PROCEDURE — 99204 OFFICE O/P NEW MOD 45 MIN: CPT | Performed by: INTERNAL MEDICINE

## 2019-04-09 PROCEDURE — 80053 COMPREHEN METABOLIC PANEL: CPT

## 2019-04-09 PROCEDURE — 77412 RADIATION TX DELIVERY LVL 3: CPT | Performed by: RADIOLOGY

## 2019-04-09 PROCEDURE — 36415 COLL VENOUS BLD VENIPUNCTURE: CPT | Performed by: INTERNAL MEDICINE

## 2019-04-09 RX ORDER — ANASTROZOLE 1 MG/1
1 TABLET ORAL DAILY
Qty: 30 TAB | Refills: 3 | Status: SHIPPED | OUTPATIENT
Start: 2019-04-09

## 2019-04-09 ASSESSMENT — PAIN SCALES - GENERAL
PAINLEVEL: NO PAIN
PAINLEVEL: NO PAIN

## 2019-04-09 NOTE — PROGRESS NOTES
Consult Note: Oncology    Date of consultation: 2019 2:39 PM    Referring provider: Wilfred Holden M.D.    Reason for consultation:   intermediate grade DCIS       History of presenting illness:     2018 - routine mammogram - microcalcifications in the upper outer quadrant of the right breast.    Diagnostic mammogram and ultrasound on 2018 -  calcifications and on ultrasound the abnormality measured 12 x 2.5 mm.    - biopsy on 1/3/2019  solid intermediate grade DCIS with comedonecrosis.     - s/p  excisional biopsy 2019 this confirmed the intermediate grade ductal carcinoma in situ 1.8 cm in greatest dimension margins were negative the closest margin was a 2 mm  ER/PA strongly positive.  Currently undergoing radiation.  She has some chronic arthralgias and h/o smoking .      Past Medical History:     & Para:   : 2, Para: 1, Number of Interrupted Pregnancies: 1 and Age at first birth: 19     Menopause Status: Post     Gynecological comments: BCP:2 years                                             HRT: 0 years     Past Medical History:   Diagnosis Date   • Arthritis    • Cancer (HCC) 2019    breast cancer   • Heart burn    • Pain     joints feet   • Psychiatric problem     depression   • Snoring        Past surgical history:    Past Surgical History:   Procedure Laterality Date   • MASTECTOMY Right 2019    Procedure: MASTECTOMY- BREAST  ;  Surgeon: Wilfred Holden M.D.;  Location: SURGERY SAME DAY [facility];  Service: General   • LUMPECTOMY Right 2019    Procedure: LUMPECTOMY WITH CELESTE  LOCALIZATION  ;  Surgeon: Wilfred Holden M.D.;  Location: SURGERY SAME DAY [facility];  Service: General   • APPENDECTOMY     • CHOLECYSTECTOMY     • OTHER      bx of thyroid mass       Allergies:  Patient has no known allergies.    Medications:    Current Outpatient Prescriptions   Medication Sig Dispense Refill   • varenicline (CHANTIX) 1 MG tablet Take 1 mg by mouth 2 times a  day.     • Ibuprofen (ADVIL PO) Take  by mouth as needed.     • Acetaminophen (TYLENOL PO) Take  by mouth as needed.     • RaNITidine HCl (ZANTAC PO) Take  by mouth as needed.       No current facility-administered medications for this visit.        Social History:     Social History     Social History   • Marital status:      Spouse name: N/A   • Number of children: N/A   • Years of education: N/A     Occupational History   •       Social History Main Topics   • Smoking status: Current Every Day Smoker     Packs/day: 1.00     Years: 25.00     Types: Cigarettes   • Smokeless tobacco: Never Used      Comment: currently down to 5 cigarettes per day (on Chantix)   • Alcohol use Yes      Comment: socially   • Drug use: No   • Sexual activity: Not on file     Other Topics Concern   • Not on file     Social History Narrative   • No narrative on file       Family History:     Family History   Problem Relation Age of Onset   • Cancer Mother         lung cancer   • Cancer Father         pancreatic cancer   • Cancer Brother         Liver cancer       Review of Systems:  All other review of systems are negative except what was mentioned above in the HPI.    Constitutional: No fever, chills, weight loss ,malaise/fatigue.    HEENT: No new auditory or visual complaints. No sore throat and neck pain.     Respiratory:No new cough, sputum production, shortness of breath and wheezing.    Cardiovascular: No new chest pain, palpitations, orthopnea and leg swelling.    Gastrointestinal: No heartburn, nausea, vomiting ,abdominal pain, hematochezia or melena     Genitourinary: Negative for dysuria, hematuria    Musculoskeletal: No new arthralgias or myalgias   Skin: Negative for rash and itching.    Neurological: Negative for focal weakness or headaches.    Endo/Heme/Allergies: No abnormal bleed/bruise.    Psychiatric/Behavioral: No new depression/anxiety.    Physical Exam:  Vitals:   /76 (BP Location: Left arm,  "Patient Position: Sitting, BP Cuff Size: Large adult)   Pulse 80   Temp 37.5 °C (99.5 °F) (Temporal)   Resp 16   Ht 1.594 m (5' 2.75\")   Wt 84.6 kg (186 lb 8.2 oz)   SpO2 94%   BMI 33.30 kg/m²     General: Not in acute distress, alert and oriented x 3  HEENT: No pallor, icterus. Oropharynx clear.   Neck: Supple, no palpable masses.  Lymph nodes: No palpable cervical, supraclavicular, axillary or inguinal lymphadenopathy.    CVS: regular rate and rhythm, no rubs or gallops  RESP: Clear to auscultate bilaterally, no wheezing or crackles.   ABD: Soft, non tender, non distended, positive bowel sounds, no palpable organomegaly  EXT: No edema or cyanosis  CNS: Alert and oriented x3, No focal deficits.  Skin- No rash  Breast- well healing rt lumpectomy scar with post op/ xrt changes     Labs:   No results for input(s): RBC, HEMOGLOBIN, HEMATOCRIT, PLATELETCT, PROTHROMBTM, APTT, INR, IRON, FERRITIN, TOTIRONBC in the last 72 hours.  Lab Results   Component Value Date/Time    SODIUM 139 02/12/2019 07:52 AM    POTASSIUM 4.7 02/12/2019 07:52 AM    CHLORIDE 105 02/12/2019 07:52 AM    CO2 27 02/12/2019 07:52 AM    GLUCOSE 108 (H) 02/12/2019 07:52 AM    BUN 9 02/12/2019 07:52 AM    CREATININE 0.70 02/12/2019 07:52 AM    CREATININE 0.7 07/27/2005 10:55 AM        Assessment and Plan:      Rt breast DCIS - Int grade ER/NH+   S/p excision with negative margins .   Currently undergoing xrt.  Discussed risk reduction strategy/antiestrogen.  She is postmenopausal.  Discussed the option of either tamoxifen or aromatase inhibitor.  She does have history of smoking and due to the slightly high risk of DVT , recommend aromatase inhibitor.  Will obtain a baseline bone density scan.    Informed her that antiestrogen treatment has not shown to improve survival rate in DCIS and the goal of treatment he is to reduce the risk of ipsilateral/contralateral recurrence of a new breast cancer.  She does have some arthralgias.  Discussed the " potential adverse effects of aromatase inhibitor including, but not limited to arthralgias, bone loss, hot flashes and other symptomatology associated with low estrogen status.  She will be finishing up her radiation a month and she will start Arimidex few days after that.  We will tentatively make a follow-up around 3 months from now.  She understands that she will need surveillance mammogram in the future .    She agreed and verbalized her agreement and understanding with the current plan.  I answered all questions and concerns she has at this time.              Thank you for allowing me to participate in her care.    Please note that this dictation was created using voice recognition software. I have made every reasonable attempt to correct obvious errors, but I expect that there are errors of grammar and possibly content that I did not discover before finalizing the note.      SIGNATURES:  Galo Chan    CC:  TOMMY Quintana James B, M.D.

## 2019-04-09 NOTE — PROGRESS NOTES
Consult Note: Oncology    Date of consultation: 4/9/2019 2:47 PM    Referring provider: Wilfred Holden M.D.    Reason for consultation: Left breast DCIS       History of presenting illness:     Dear  Wilfred Holden M.D.,    Thank you very much for allowing me to see  Dayana Benedict today . As you know she  is a 59 y.o. 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   • Heart burn    • Pain     joints feet   • Psychiatric problem     depression   • Snoring        Past surgical history:    Past Surgical History:   Procedure Laterality Date   • MASTECTOMY Right 2/14/2019    Procedure: MASTECTOMY- BREAST  ;  Surgeon: Wilfred Holden M.D.;  Location: SURGERY SAME DAY [facility] ORS;  Service: General   • LUMPECTOMY Right 2/14/2019    Procedure: LUMPECTOMY WITH CELESTE  LOCALIZATION  ;  Surgeon: Wilfred Holden M.D.;  Location: SURGERY SAME DAY [facility] ORS;  Service: General   • APPENDECTOMY     • CHOLECYSTECTOMY     • OTHER      bx of thyroid mass       Allergies:  Patient has no known allergies.    Medications:    Current Outpatient Prescriptions   Medication Sig Dispense Refill   • varenicline (CHANTIX) 1 MG tablet Take 1 mg by mouth 2 times a day.     • Ibuprofen (ADVIL PO) Take  by mouth as needed.     • Acetaminophen (TYLENOL PO) Take  by mouth as needed.     • RaNITidine HCl (ZANTAC PO) Take  by mouth as needed.       No current facility-administered medications for this visit.        Social History:     Social History     Social History   • Marital status:      Spouse name: N/A   • Number of children: N/A   • Years of education: N/A     Occupational History   •       Social History Main Topics   • Smoking status: Current Every Day Smoker     Packs/day: 1.00     Years: 25.00     Types: Cigarettes   • Smokeless tobacco: Never Used       "Comment: currently down to 5 cigarettes per day (on Chantix)   • Alcohol use Yes      Comment: socially   • Drug use: No   • Sexual activity: Not on file     Other Topics Concern   • Not on file     Social History Narrative   • No narrative on file       Family History:     Family History   Problem Relation Age of Onset   • Cancer Mother         lung cancer   • Cancer Father         pancreatic cancer   • Cancer Brother         Liver cancer       Review of Systems:  All other review of systems are negative except what was mentioned above in the HPI.    Constitutional: No fever, chills, weight loss ,malaise/fatigue.    HEENT: No new auditory or visual complaints. No sore throat and neck pain.     Respiratory:No new cough, sputum production, shortness of breath and wheezing.    Cardiovascular: No new chest pain, palpitations, orthopnea and leg swelling.    Gastrointestinal: No heartburn, nausea, vomiting ,abdominal pain, hematochezia or melena     Genitourinary: Negative for dysuria, hematuria    Musculoskeletal: No new arthralgias or myalgias   Skin: Negative for rash and itching.    Neurological: Negative for focal weakness or headaches.    Endo/Heme/Allergies: No abnormal bleed/bruise.    Psychiatric/Behavioral: No new depression/anxiety.    Physical Exam:  Vitals:   /76 (BP Location: Left arm, Patient Position: Sitting, BP Cuff Size: Large adult)   Pulse 80   Temp 37.5 °C (99.5 °F) (Temporal)   Resp 16   Ht 1.594 m (5' 2.75\")   Wt 84.6 kg (186 lb 8.2 oz)   SpO2 94%   BMI 33.30 kg/m²     General: Not in acute distress, alert and oriented x 3  HEENT: No pallor, icterus. Oropharynx clear.   Neck: Supple, no palpable masses.  Lymph nodes: No palpable cervical, supraclavicular, axillary or inguinal lymphadenopathy.    CVS: regular rate and rhythm, no rubs or gallops  RESP: Clear to auscultate bilaterally, no wheezing or crackles.   ABD: Soft, non tender, non distended, positive bowel sounds, no palpable " organomegaly  EXT: No edema or cyanosis  CNS: Alert and oriented x3, No focal deficits.  Skin- No rash      Labs:   No results for input(s): RBC, HEMOGLOBIN, HEMATOCRIT, PLATELETCT, PROTHROMBTM, APTT, INR, IRON, FERRITIN, TOTIRONBC in the last 72 hours.  Lab Results   Component Value Date/Time    SODIUM 139 02/12/2019 07:52 AM    POTASSIUM 4.7 02/12/2019 07:52 AM    CHLORIDE 105 02/12/2019 07:52 AM    CO2 27 02/12/2019 07:52 AM    GLUCOSE 108 (H) 02/12/2019 07:52 AM    BUN 9 02/12/2019 07:52 AM    CREATININE 0.70 02/12/2019 07:52 AM    CREATININE 0.7 07/27/2005 10:55 AM        Assessment and Plan:   Left breast DCIS.       She agreed and verbalized her agreement and understanding with the current plan.  I answered all questions and concerns she has at this time.              Thank you for allowing me to participate in her care.    Please note that this dictation was created using voice recognition software. I have made every reasonable attempt to correct obvious errors, but I expect that there are errors of grammar and possibly content that I did not discover before finalizing the note.      SIGNATURES:  Galo Chan    CC:  TOMMY Quintana James B, M.D.

## 2019-04-09 NOTE — PROGRESS NOTES
Dayana Benedict is a 59 y.o. female here for a non-provider visit for: Lab Draws  on 4/9/2019 at 2:57 PM    Procedure Performed: Venipuncture     Anatomical site: Left Antecubital Area (AC)    Equipment used: 21g butterfly     Labs drawn: [employer] (two lavender EDTA tubes) and CMP    Ordering Provider: {Dr. Galo Chan    Willem By: Carline Sarah, Med Ass't  Successful blood draw.

## 2019-04-10 ENCOUNTER — HOSPITAL ENCOUNTER (OUTPATIENT)
Dept: RADIATION ONCOLOGY | Facility: MEDICAL CENTER | Age: 59
End: 2019-04-10

## 2019-04-10 LAB
CHEMOTHERAPY INFUSION START DATE: NORMAL
CHEMOTHERAPY RECORDS: 2.67
CHEMOTHERAPY RECORDS: 4005
CHEMOTHERAPY RECORDS: NORMAL
CHEMOTHERAPY RX CANCER: NORMAL
RAD ONC ARIA COURSE TREATMENT ELAPSED DAYS: NORMAL
RAD ONC ARIA PLAN TREATMENT DATES: NORMAL
RAD ONC ARIA REFERENCE POINT DOSAGE GIVEN TO DATE: 34.71
RAD ONC ARIA REFERENCE POINT DOSAGE GIVEN TO DATE: 34.71
RAD ONC ARIA REFERENCE POINT ID: NORMAL
RAD ONC ARIA REFERENCE POINT ID: NORMAL
RAD ONC ARIA REFERENCE POINT SESSION DOSAGE GIVEN: 2.67
RAD ONC ARIA REFERENCE POINT SESSION DOSAGE GIVEN: 2.67

## 2019-04-10 PROCEDURE — 77334 RADIATION TREATMENT AID(S): CPT | Performed by: RADIOLOGY

## 2019-04-10 PROCEDURE — 77307 TELETHX ISODOSE PLAN CPLX: CPT | Mod: 26 | Performed by: RADIOLOGY

## 2019-04-10 PROCEDURE — 77336 RADIATION PHYSICS CONSULT: CPT | Mod: XU | Performed by: RADIOLOGY

## 2019-04-10 PROCEDURE — 77412 RADIATION TX DELIVERY LVL 3: CPT | Performed by: RADIOLOGY

## 2019-04-10 PROCEDURE — 77307 TELETHX ISODOSE PLAN CPLX: CPT | Performed by: RADIOLOGY

## 2019-04-10 PROCEDURE — 77334 RADIATION TREATMENT AID(S): CPT | Mod: 26 | Performed by: RADIOLOGY

## 2019-04-11 ENCOUNTER — HOSPITAL ENCOUNTER (OUTPATIENT)
Dept: RADIATION ONCOLOGY | Facility: MEDICAL CENTER | Age: 59
End: 2019-04-11

## 2019-04-11 LAB
CHEMOTHERAPY INFUSION START DATE: NORMAL
CHEMOTHERAPY RECORDS: 2.67
CHEMOTHERAPY RECORDS: 4005
CHEMOTHERAPY RECORDS: NORMAL
CHEMOTHERAPY RX CANCER: NORMAL
RAD ONC ARIA COURSE TREATMENT ELAPSED DAYS: NORMAL
RAD ONC ARIA PLAN TREATMENT DATES: NORMAL
RAD ONC ARIA REFERENCE POINT DOSAGE GIVEN TO DATE: 37.38
RAD ONC ARIA REFERENCE POINT DOSAGE GIVEN TO DATE: 37.38
RAD ONC ARIA REFERENCE POINT ID: NORMAL
RAD ONC ARIA REFERENCE POINT ID: NORMAL
RAD ONC ARIA REFERENCE POINT SESSION DOSAGE GIVEN: 2.67
RAD ONC ARIA REFERENCE POINT SESSION DOSAGE GIVEN: 2.67

## 2019-04-11 PROCEDURE — 77412 RADIATION TX DELIVERY LVL 3: CPT | Performed by: RADIOLOGY

## 2019-04-12 ENCOUNTER — HOSPITAL ENCOUNTER (OUTPATIENT)
Dept: RADIATION ONCOLOGY | Facility: MEDICAL CENTER | Age: 59
End: 2019-04-12

## 2019-04-12 LAB
CHEMOTHERAPY INFUSION START DATE: NORMAL
CHEMOTHERAPY RECORDS: 2.67
CHEMOTHERAPY RECORDS: 4005
CHEMOTHERAPY RECORDS: NORMAL
CHEMOTHERAPY RX CANCER: NORMAL
RAD ONC ARIA COURSE TREATMENT ELAPSED DAYS: NORMAL
RAD ONC ARIA PLAN TREATMENT DATES: NORMAL
RAD ONC ARIA REFERENCE POINT DOSAGE GIVEN TO DATE: 40.05
RAD ONC ARIA REFERENCE POINT DOSAGE GIVEN TO DATE: 40.05
RAD ONC ARIA REFERENCE POINT ID: NORMAL
RAD ONC ARIA REFERENCE POINT ID: NORMAL
RAD ONC ARIA REFERENCE POINT SESSION DOSAGE GIVEN: 2.67
RAD ONC ARIA REFERENCE POINT SESSION DOSAGE GIVEN: 2.67

## 2019-04-12 PROCEDURE — 77427 RADIATION TX MANAGEMENT X5: CPT | Performed by: RADIOLOGY

## 2019-04-12 PROCEDURE — 77412 RADIATION TX DELIVERY LVL 3: CPT | Performed by: RADIOLOGY

## 2019-04-15 ENCOUNTER — HOSPITAL ENCOUNTER (OUTPATIENT)
Dept: RADIATION ONCOLOGY | Facility: MEDICAL CENTER | Age: 59
End: 2019-04-15

## 2019-04-15 LAB
CHEMOTHERAPY INFUSION START DATE: NORMAL
CHEMOTHERAPY RECORDS: 1000
CHEMOTHERAPY RECORDS: 2
CHEMOTHERAPY RECORDS: NORMAL
CHEMOTHERAPY RX CANCER: NORMAL
RAD ONC ARIA COURSE TREATMENT ELAPSED DAYS: NORMAL
RAD ONC ARIA PLAN TREATMENT DATES: NORMAL
RAD ONC ARIA REFERENCE POINT DOSAGE GIVEN TO DATE: 2
RAD ONC ARIA REFERENCE POINT DOSAGE GIVEN TO DATE: 2.1
RAD ONC ARIA REFERENCE POINT ID: NORMAL
RAD ONC ARIA REFERENCE POINT ID: NORMAL
RAD ONC ARIA REFERENCE POINT SESSION DOSAGE GIVEN: 2
RAD ONC ARIA REFERENCE POINT SESSION DOSAGE GIVEN: 2.1

## 2019-04-15 PROCEDURE — 77280 THER RAD SIMULAJ FIELD SMPL: CPT | Mod: 26 | Performed by: RADIOLOGY

## 2019-04-15 PROCEDURE — 77412 RADIATION TX DELIVERY LVL 3: CPT | Performed by: RADIOLOGY

## 2019-04-15 PROCEDURE — 77280 THER RAD SIMULAJ FIELD SMPL: CPT | Performed by: RADIOLOGY

## 2019-04-15 PROCEDURE — 77417 THER RADIOLOGY PORT IMAGE(S): CPT | Performed by: RADIOLOGY

## 2019-04-16 ENCOUNTER — HOSPITAL ENCOUNTER (OUTPATIENT)
Dept: RADIATION ONCOLOGY | Facility: MEDICAL CENTER | Age: 59
End: 2019-04-16

## 2019-04-16 LAB
CHEMOTHERAPY INFUSION START DATE: NORMAL
CHEMOTHERAPY RECORDS: 1000
CHEMOTHERAPY RECORDS: 2
CHEMOTHERAPY RECORDS: NORMAL
CHEMOTHERAPY RX CANCER: NORMAL
RAD ONC ARIA COURSE TREATMENT ELAPSED DAYS: NORMAL
RAD ONC ARIA PLAN TREATMENT DATES: NORMAL
RAD ONC ARIA REFERENCE POINT DOSAGE GIVEN TO DATE: 4
RAD ONC ARIA REFERENCE POINT DOSAGE GIVEN TO DATE: 4.2
RAD ONC ARIA REFERENCE POINT ID: NORMAL
RAD ONC ARIA REFERENCE POINT ID: NORMAL
RAD ONC ARIA REFERENCE POINT SESSION DOSAGE GIVEN: 2
RAD ONC ARIA REFERENCE POINT SESSION DOSAGE GIVEN: 2.1

## 2019-04-16 PROCEDURE — 77014 PR CT GUIDANCE PLACEMENT RAD THERAPY FIELDS: CPT | Mod: 26 | Performed by: RADIOLOGY

## 2019-04-16 PROCEDURE — 77387 GUIDANCE FOR RADJ TX DLVR: CPT | Performed by: RADIOLOGY

## 2019-04-16 PROCEDURE — 77412 RADIATION TX DELIVERY LVL 3: CPT | Performed by: RADIOLOGY

## 2019-04-17 LAB
CHEMOTHERAPY INFUSION START DATE: NORMAL
CHEMOTHERAPY RECORDS: 1000
CHEMOTHERAPY RECORDS: 2
CHEMOTHERAPY RECORDS: NORMAL
CHEMOTHERAPY RX CANCER: NORMAL
RAD ONC ARIA COURSE TREATMENT ELAPSED DAYS: NORMAL
RAD ONC ARIA PLAN TREATMENT DATES: NORMAL
RAD ONC ARIA REFERENCE POINT DOSAGE GIVEN TO DATE: 6
RAD ONC ARIA REFERENCE POINT DOSAGE GIVEN TO DATE: 6.3
RAD ONC ARIA REFERENCE POINT ID: NORMAL
RAD ONC ARIA REFERENCE POINT ID: NORMAL
RAD ONC ARIA REFERENCE POINT SESSION DOSAGE GIVEN: 2
RAD ONC ARIA REFERENCE POINT SESSION DOSAGE GIVEN: 2.1

## 2019-04-17 PROCEDURE — 77336 RADIATION PHYSICS CONSULT: CPT | Performed by: RADIOLOGY

## 2019-04-17 PROCEDURE — 77014 PR CT GUIDANCE PLACEMENT RAD THERAPY FIELDS: CPT | Mod: 26 | Performed by: RADIOLOGY

## 2019-04-17 PROCEDURE — 77387 GUIDANCE FOR RADJ TX DLVR: CPT | Performed by: RADIOLOGY

## 2019-04-17 PROCEDURE — 77412 RADIATION TX DELIVERY LVL 3: CPT | Performed by: RADIOLOGY

## 2019-04-18 ENCOUNTER — HOSPITAL ENCOUNTER (OUTPATIENT)
Dept: RADIATION ONCOLOGY | Facility: MEDICAL CENTER | Age: 59
End: 2019-04-18

## 2019-04-18 LAB
CHEMOTHERAPY INFUSION START DATE: NORMAL
CHEMOTHERAPY RECORDS: 1000
CHEMOTHERAPY RECORDS: 2
CHEMOTHERAPY RECORDS: NORMAL
CHEMOTHERAPY RX CANCER: NORMAL
RAD ONC ARIA COURSE TREATMENT ELAPSED DAYS: NORMAL
RAD ONC ARIA PLAN TREATMENT DATES: NORMAL
RAD ONC ARIA REFERENCE POINT DOSAGE GIVEN TO DATE: 8
RAD ONC ARIA REFERENCE POINT DOSAGE GIVEN TO DATE: 8.4
RAD ONC ARIA REFERENCE POINT ID: NORMAL
RAD ONC ARIA REFERENCE POINT ID: NORMAL
RAD ONC ARIA REFERENCE POINT SESSION DOSAGE GIVEN: 2
RAD ONC ARIA REFERENCE POINT SESSION DOSAGE GIVEN: 2.1

## 2019-04-18 PROCEDURE — 77387 GUIDANCE FOR RADJ TX DLVR: CPT | Performed by: RADIOLOGY

## 2019-04-18 PROCEDURE — 77014 PR CT GUIDANCE PLACEMENT RAD THERAPY FIELDS: CPT | Mod: 26 | Performed by: RADIOLOGY

## 2019-04-18 PROCEDURE — 77412 RADIATION TX DELIVERY LVL 3: CPT | Performed by: RADIOLOGY

## 2019-04-19 ENCOUNTER — HOSPITAL ENCOUNTER (OUTPATIENT)
Dept: RADIATION ONCOLOGY | Facility: MEDICAL CENTER | Age: 59
End: 2019-04-19

## 2019-04-19 LAB
CHEMOTHERAPY INFUSION START DATE: NORMAL
CHEMOTHERAPY RECORDS: 1000
CHEMOTHERAPY RECORDS: 2
CHEMOTHERAPY RECORDS: NORMAL
CHEMOTHERAPY RX CANCER: NORMAL
RAD ONC ARIA COURSE TREATMENT ELAPSED DAYS: NORMAL
RAD ONC ARIA PLAN TREATMENT DATES: NORMAL
RAD ONC ARIA REFERENCE POINT DOSAGE GIVEN TO DATE: 10
RAD ONC ARIA REFERENCE POINT DOSAGE GIVEN TO DATE: 10.5
RAD ONC ARIA REFERENCE POINT ID: NORMAL
RAD ONC ARIA REFERENCE POINT ID: NORMAL
RAD ONC ARIA REFERENCE POINT SESSION DOSAGE GIVEN: 2
RAD ONC ARIA REFERENCE POINT SESSION DOSAGE GIVEN: 2.1

## 2019-04-19 PROCEDURE — 77412 RADIATION TX DELIVERY LVL 3: CPT | Performed by: RADIOLOGY

## 2019-04-19 PROCEDURE — 77387 GUIDANCE FOR RADJ TX DLVR: CPT | Performed by: RADIOLOGY

## 2019-04-19 PROCEDURE — 77014 PR CT GUIDANCE PLACEMENT RAD THERAPY FIELDS: CPT | Mod: 26 | Performed by: RADIOLOGY

## 2019-04-19 PROCEDURE — 77427 RADIATION TX MANAGEMENT X5: CPT | Performed by: RADIOLOGY

## 2019-04-23 ENCOUNTER — HOSPITAL ENCOUNTER (OUTPATIENT)
Dept: RADIOLOGY | Facility: MEDICAL CENTER | Age: 59
End: 2019-04-23
Attending: INTERNAL MEDICINE
Payer: MEDICAID

## 2019-04-23 DIAGNOSIS — D05.11 DUCTAL CARCINOMA IN SITU (DCIS) OF RIGHT BREAST: ICD-10-CM

## 2019-04-23 PROCEDURE — 77080 DXA BONE DENSITY AXIAL: CPT

## 2019-04-24 LAB
CHEMOTHERAPY INFUSION START DATE: NORMAL
CHEMOTHERAPY RECORDS: 1000
CHEMOTHERAPY RECORDS: 2
CHEMOTHERAPY RECORDS: 2.67
CHEMOTHERAPY RECORDS: 4005
CHEMOTHERAPY RECORDS: NORMAL
CHEMOTHERAPY RX CANCER: NORMAL
RAD ONC ARIA COURSE TREATMENT ELAPSED DAYS: NORMAL
RAD ONC ARIA PLAN TREATMENT DATES: NORMAL
RAD ONC ARIA REFERENCE POINT DOSAGE GIVEN TO DATE: 10
RAD ONC ARIA REFERENCE POINT DOSAGE GIVEN TO DATE: 10.5
RAD ONC ARIA REFERENCE POINT DOSAGE GIVEN TO DATE: 40.05
RAD ONC ARIA REFERENCE POINT DOSAGE GIVEN TO DATE: 40.05
RAD ONC ARIA REFERENCE POINT ID: NORMAL

## 2019-06-11 ENCOUNTER — HOSPITAL ENCOUNTER (OUTPATIENT)
Dept: RADIATION ONCOLOGY | Facility: MEDICAL CENTER | Age: 59
End: 2019-06-30
Attending: RADIOLOGY
Payer: MEDICAID

## 2019-06-11 ENCOUNTER — PATIENT OUTREACH (OUTPATIENT)
Dept: OTHER | Facility: MEDICAL CENTER | Age: 59
End: 2019-06-11

## 2019-06-11 VITALS
HEART RATE: 85 BPM | BODY MASS INDEX: 33.85 KG/M2 | TEMPERATURE: 97.7 F | SYSTOLIC BLOOD PRESSURE: 105 MMHG | DIASTOLIC BLOOD PRESSURE: 73 MMHG | OXYGEN SATURATION: 95 % | WEIGHT: 189.6 LBS

## 2019-06-11 PROCEDURE — 99212 OFFICE O/P EST SF 10 MIN: CPT | Performed by: RADIOLOGY

## 2019-06-11 ASSESSMENT — PAIN SCALES - GENERAL: PAINLEVEL: NO PAIN

## 2019-06-11 NOTE — PROGRESS NOTES
RADIATION ONCOLOGY FOLLOW-UP    DATE OF SERVICE: 6/11/2019    IDENTIFICATION:   A 59 y.o. female with intermediate grade DCIS status post lumpectomy with negative margins.  Radiation therapy complete 4/19/2019..    HISTORY OF PRESENT ILLNESS:   Since last seen she started on anastrozole.  She is feeling good she did develop a little bit of soreness in the irradiated breast which resolved quickly after a week.  Her energy is good and she is essentially without any complaints.    CURRENT MEDICATIONS:  Current Outpatient Prescriptions   Medication Sig Dispense Refill   • anastrozole (ARIMIDEX) 1 MG Tab Take 1 Tab by mouth every day. 30 Tab 3   • varenicline (CHANTIX) 1 MG tablet Take 1 mg by mouth 2 times a day.     • RaNITidine HCl (ZANTAC PO) Take  by mouth as needed.     • Acetaminophen (TYLENOL PO) Take  by mouth as needed.     • Ibuprofen (ADVIL PO) Take  by mouth as needed.       No current facility-administered medications for this encounter.        ALLERGIES:  Patient has no known allergies.    FAMILY HISTORY:    Family History   Problem Relation Age of Onset   • Cancer Mother         lung cancer   • Cancer Father         pancreatic cancer   • Cancer Brother         Liver cancer   [unfilled]        SOCIAL HISTORY:     reports that she quit smoking about 3 weeks ago. Her smoking use included Cigarettes. She has a 25.00 pack-year smoking history. She has quit using smokeless tobacco. She reports that she drinks alcohol. She reports that she does not use drugs.        REVIEW OF SYSTEMS: Is significant for that mentioned in the HPI  The rest of the review of systems has been reviewed by me and is documented in the nursing note in [facility] dated 3/11/2019    PHYSICAL EXAM:     ECOG PERFORMANCE STATUS:  0= Fully active, able to carry on all pre-disease performance without restriction.       Vitals:    06/11/19 1424   BP: 105/73   BP Location: Left arm   Patient Position: Sitting   BP Cuff Size: Adult   Pulse: 85   Temp:  36.5 °C (97.7 °F)   TempSrc: Temporal   SpO2: 95%   Weight: 86 kg (189 lb 9.6 oz)   Pain Score: No pain        GENERAL: Well-appearing alert and oriented x3 in no apparent distress  Breasts: Bilaterally symmetrical the right breast has a little more hyperpigmentation and induration consistent with radiation change but there is no discrete mass in either breast or axilla.  HEENT:  Pupils are equal, round, and reactive to light.  Extraocular muscles   are intact. Sclerae nonicteric.  Conjunctivae pink.  Oral cavity, tongue   protrudes midline.   NECK: No palpable nodes in the neck supra navicular axillary regions lUNGS:  Clear to ascultation   EXTREMITIES:  Without Edema.  NEUROLOGIC:  Cranial nerves II through XII were intact. Normal stance and gait motor and sensory grossly within normal limits          IMPRESSION:    A 59 y.o. female with intermediate grade DCIS status post lumpectomy with negative margins.  Radiation therapy complete 4/19/2019, currently on anastrozole.    RECOMMENDATIONS:   Patient understands she will continue her follow-up with Dr. Chan based on national cancer guidelines.  I am happy to see her on an as-needed basis.      Thank you for the opportunity to participate in her care.  If any questions or comments, please do not hesitate in calling.      Please note that this dictation was created using voice recognition software. I have made every reasonable attempt to correct obvious errors, but I expect that there are errors of grammar and possibly content that I did not discover before finalizing the note.

## 2019-06-11 NOTE — NON-PROVIDER
Patient was seen today in clinic with Dr. Burton for follow up.  Vitals signs and weight were obtained and pain assessment was completed.  Allergies and medications were reviewed with the patient.  Toxicities of treatment assessed.     Vitals/Pain:  Vitals:    06/11/19 1424   BP: 105/73   BP Location: Left arm   Patient Position: Sitting   BP Cuff Size: Adult   Pulse: 85   Temp: 36.5 °C (97.7 °F)   TempSrc: Temporal   SpO2: 95%   Weight: 86 kg (189 lb 9.6 oz)   Pain Score: No pain        Allergies:   Patient has no known allergies.    Current Medications:  Current Outpatient Prescriptions   Medication Sig Dispense Refill   • anastrozole (ARIMIDEX) 1 MG Tab Take 1 Tab by mouth every day. 30 Tab 3   • varenicline (CHANTIX) 1 MG tablet Take 1 mg by mouth 2 times a day.     • RaNITidine HCl (ZANTAC PO) Take  by mouth as needed.     • Acetaminophen (TYLENOL PO) Take  by mouth as needed.     • Ibuprofen (ADVIL PO) Take  by mouth as needed.       No current facility-administered medications for this encounter.          PCP:  Adeline Coughlin, Med Ass't

## 2019-06-11 NOTE — PROGRESS NOTES
Phoned to review cancer treatment summary/ survivorship care plan.  No answer, left message to call back. Will place in mail today.

## 2019-06-11 NOTE — LETTER
[facility]   [address] [place] [address] [address]  [place] [address]  Phone: [phone] - Fax: [phone]              Date: 06/11/19    Address:   [address] [place] [address]    Medical Record Number: [id number]  Re: Treatment Summary and Survivorship Care Plan      Dear Dayana,    Please find enclosed your cancer Treatment Summary and Follow Up Care Plan, also known as a survivorship care plan. It is a best practice in cancer care for you to receive this document for your records. It contains important information for you including: a contact list of your treatment team, a summary of your diagnosis and treatment, follow up care and potential late effects of treatment, and resources you may be interested in.    Why is a survivorship care plan important? This is a record for you to keep on file for future reference, but it is also a communication tool that you can share with your non-cancer medical providers to inform them of potential long-term or late effects of your cancer treatment and recommended follow up screening needed to maintain optimal health.     We will be calling you to confirm you received this document, to answer any questions you might have, and to assist you in identifying resources if necessary. Be sure to bring it with you to your next scheduled follow up with your provider to review.     You can contact us at [phone] if you have questions.     Kind Regards,     Rafy Grewal, RN, OCN, CBCN  Cancer Nurse Navigator

## 2019-06-19 ENCOUNTER — PATIENT OUTREACH (OUTPATIENT)
Dept: OTHER | Facility: MEDICAL CENTER | Age: 59
End: 2019-06-19

## 2019-08-25 ENCOUNTER — APPOINTMENT (OUTPATIENT)
Dept: RADIOLOGY | Facility: MEDICAL CENTER | Age: 59
End: 2019-08-25
Attending: EMERGENCY MEDICINE
Payer: MEDICAID

## 2019-08-25 ENCOUNTER — HOSPITAL ENCOUNTER (EMERGENCY)
Facility: MEDICAL CENTER | Age: 59
End: 2019-08-25
Attending: EMERGENCY MEDICINE
Payer: MEDICAID

## 2019-08-25 VITALS
WEIGHT: 190 LBS | HEART RATE: 98 BPM | OXYGEN SATURATION: 98 % | TEMPERATURE: 97.2 F | SYSTOLIC BLOOD PRESSURE: 125 MMHG | RESPIRATION RATE: 18 BRPM | BODY MASS INDEX: 33.93 KG/M2 | DIASTOLIC BLOOD PRESSURE: 71 MMHG

## 2019-08-25 DIAGNOSIS — S09.90XA TRAUMATIC INJURY OF HEAD, INITIAL ENCOUNTER: ICD-10-CM

## 2019-08-25 DIAGNOSIS — R46.89 AGGRESSIVE BEHAVIOR: ICD-10-CM

## 2019-08-25 DIAGNOSIS — F10.921 ALCOHOL INTOXICATION WITH DELIRIUM (HCC): ICD-10-CM

## 2019-08-25 LAB
ALBUMIN SERPL BCP-MCNC: 3.9 G/DL (ref 3.2–4.9)
ALBUMIN/GLOB SERPL: 1 G/DL
ALP SERPL-CCNC: 69 U/L (ref 30–99)
ALT SERPL-CCNC: 69 U/L (ref 2–50)
ANION GAP SERPL CALC-SCNC: 10 MMOL/L (ref 0–11.9)
AST SERPL-CCNC: 62 U/L (ref 12–45)
BASOPHILS # BLD AUTO: 0.7 % (ref 0–1.8)
BASOPHILS # BLD: 0.03 K/UL (ref 0–0.12)
BILIRUB SERPL-MCNC: 0.4 MG/DL (ref 0.1–1.5)
BUN SERPL-MCNC: 11 MG/DL (ref 8–22)
CALCIUM SERPL-MCNC: 9 MG/DL (ref 8.5–10.5)
CHLORIDE SERPL-SCNC: 107 MMOL/L (ref 96–112)
CO2 SERPL-SCNC: 23 MMOL/L (ref 20–33)
CREAT SERPL-MCNC: 0.66 MG/DL (ref 0.5–1.4)
EOSINOPHIL # BLD AUTO: 0.05 K/UL (ref 0–0.51)
EOSINOPHIL NFR BLD: 1.1 % (ref 0–6.9)
ERYTHROCYTE [DISTWIDTH] IN BLOOD BY AUTOMATED COUNT: 43.1 FL (ref 35.9–50)
ETHANOL BLD-MCNC: 0.26 G/DL
GLOBULIN SER CALC-MCNC: 3.9 G/DL (ref 1.9–3.5)
GLUCOSE SERPL-MCNC: 168 MG/DL (ref 65–99)
HCT VFR BLD AUTO: 41.9 % (ref 37–47)
HGB BLD-MCNC: 14.4 G/DL (ref 12–16)
IMM GRANULOCYTES # BLD AUTO: 0.01 K/UL (ref 0–0.11)
IMM GRANULOCYTES NFR BLD AUTO: 0.2 % (ref 0–0.9)
LYMPHOCYTES # BLD AUTO: 1.68 K/UL (ref 1–4.8)
LYMPHOCYTES NFR BLD: 36.5 % (ref 22–41)
MCH RBC QN AUTO: 33 PG (ref 27–33)
MCHC RBC AUTO-ENTMCNC: 34.4 G/DL (ref 33.6–35)
MCV RBC AUTO: 95.9 FL (ref 81.4–97.8)
MONOCYTES # BLD AUTO: 0.31 K/UL (ref 0–0.85)
MONOCYTES NFR BLD AUTO: 6.7 % (ref 0–13.4)
NEUTROPHILS # BLD AUTO: 2.52 K/UL (ref 2–7.15)
NEUTROPHILS NFR BLD: 54.8 % (ref 44–72)
NRBC # BLD AUTO: 0 K/UL
NRBC BLD-RTO: 0 /100 WBC
PLATELET # BLD AUTO: 173 K/UL (ref 164–446)
PMV BLD AUTO: 10.5 FL (ref 9–12.9)
POC BREATHALIZER: 0.09 PERCENT (ref 0–0.01)
POTASSIUM SERPL-SCNC: 3.7 MMOL/L (ref 3.6–5.5)
PROT SERPL-MCNC: 7.8 G/DL (ref 6–8.2)
RBC # BLD AUTO: 4.37 M/UL (ref 4.2–5.4)
SODIUM SERPL-SCNC: 140 MMOL/L (ref 135–145)
WBC # BLD AUTO: 4.6 K/UL (ref 4.8–10.8)

## 2019-08-25 PROCEDURE — 302970 POC BREATHALIZER: Performed by: EMERGENCY MEDICINE

## 2019-08-25 PROCEDURE — 85025 COMPLETE CBC W/AUTO DIFF WBC: CPT

## 2019-08-25 PROCEDURE — 80053 COMPREHEN METABOLIC PANEL: CPT

## 2019-08-25 PROCEDURE — 700111 HCHG RX REV CODE 636 W/ 250 OVERRIDE (IP): Performed by: EMERGENCY MEDICINE

## 2019-08-25 PROCEDURE — 80307 DRUG TEST PRSMV CHEM ANLYZR: CPT

## 2019-08-25 PROCEDURE — 99285 EMERGENCY DEPT VISIT HI MDM: CPT

## 2019-08-25 PROCEDURE — 90791 PSYCH DIAGNOSTIC EVALUATION: CPT

## 2019-08-25 PROCEDURE — 96374 THER/PROPH/DIAG INJ IV PUSH: CPT

## 2019-08-25 PROCEDURE — 70450 CT HEAD/BRAIN W/O DYE: CPT

## 2019-08-25 RX ORDER — LORAZEPAM 2 MG/ML
0.5 INJECTION INTRAMUSCULAR ONCE
Status: COMPLETED | OUTPATIENT
Start: 2019-08-25 | End: 2019-08-25

## 2019-08-25 RX ADMIN — LORAZEPAM 0.5 MG: 2 INJECTION INTRAMUSCULAR; INTRAVENOUS at 05:15

## 2019-08-25 NOTE — ED PROVIDER NOTES
ED PROVIDER NOTE    Scribed for Ninfa Downing M.D. by Perla Wilson. 8/25/2019, 2:18 PM.    This is an addendum to the note on Dayana Benedict. For further details and full chart entry, see the previously signed ED Provider Note written by Dr. Hernandez (ERP).      2:18 PM - I discussed the patient's case with Dr. Hernandez (ERP) who will transfer care of the patient to me at this time.       1:30 PM - Patient was seen by life skills and psychiatry. Patient is cleared for discharge.     FINAL IMPRESSION   1. Alcohol intoxication with delirium (HCC)    2. Traumatic injury of head, initial encounter    3. Aggressive behavior         Perla LUCAS (Scribe), am scribing for, and in the presence of, Ninfa Downing M.D..    Electronically signed by: Perla Wilson (Scribe), 8/25/2019    Ninfa LUCAS M.D. personally performed the services described in this documentation, as scribed by Perla Wilson in my presence, and it is both accurate and complete.    The note accurately reflects work and decisions made by me.  Ninfa Downing  8/25/2019  4:12 PM

## 2019-08-25 NOTE — ED NOTES
Patient resting in room with equal chest rise and fall, NAD Noted. VSS. Will continue to monitor.

## 2019-08-25 NOTE — ED NOTES
Break RN: Son called and voicemail left stating pt is ready for discharge and is awaiting his arrival.

## 2019-08-25 NOTE — ED PROVIDER NOTES
ED Provider Note    Scribed for Jimi Hernandez M.D. by Josr Hyde. 2019  4:50 AM    Primary care provider: Shannan Lr M.D.  Means of arrival: EMS  History obtained from: Patient  History limited by: None    CHIEF COMPLAINT  Chief Complaint   Patient presents with   • Alcohol Intoxication   • Suicidal Ideation       HPI  Dayana Benedict is a 59 y.o. female who presents to the Emergency Department for evaluation of altered mental status and suicidal ideation. [facility] was called to the patient's home by an unknown caller this evening. There were no lights on in the home and EMS found the patient on the floor. There was no one else in the home with the patient. She sustained a laceration to her left eyebrow. Per nursing, she remembers going to a [employer] performance at the [facility] yesterday evening. Per nursing, patient indicated suicidal ideation prior to my exam. Suicidal ideation was not stated during my exam. Patient has a history of breast cancer and is currently receiving amridex chemotherapy treatments.    Full HPI could not be obtained as patient is an uncooperative historian.    REVIEW OF SYSTEMS  Pertinent positives include altered mental status, head trauma, suicidal ideation.  Full ROS could not be obtained as patient is an uncooperative historian.    PAST MEDICAL HISTORY   has a past medical history of Arthritis, Cancer (HCC) (), Ductal carcinoma in situ (DCIS) of right breast (2019), Heart burn, Pain, Psychiatric problem, and Snoring.    SURGICAL HISTORY   has a past surgical history that includes appendectomy; cholecystectomy; other; mastectomy (Right, 2019); and lumpectomy (Right, 2019).    SOCIAL HISTORY  Social History     Tobacco Use   • Smoking status: Former Smoker     Packs/day: 1.00     Years: 25.00     Pack years: 25.00     Types: Cigarettes     Last attempt to quit: 2019     Years since quittin.2   • Smokeless tobacco: Former User   •  Tobacco comment: currently down to 5 cigarettes per day (on Chantix)   Substance Use Topics   • Alcohol use: Yes     Comment: socially   • Drug use: No      Social History     Substance and Sexual Activity   Drug Use No       FAMILY HISTORY  Family History   Problem Relation Age of Onset   • Cancer Mother         lung cancer   • Cancer Father         pancreatic cancer   • Cancer Brother         Liver cancer       CURRENT MEDICATIONS  No current facility-administered medications on file prior to encounter.      Current Outpatient Medications on File Prior to Encounter   Medication Sig Dispense Refill   • anastrozole (ARIMIDEX) 1 MG Tab Take 1 Tab by mouth every day. 30 Tab 3   • varenicline (CHANTIX) 1 MG tablet Take 1 mg by mouth 2 times a day.     • Acetaminophen (TYLENOL PO) Take  by mouth as needed.     • Ibuprofen (ADVIL PO) Take  by mouth as needed.     • RaNITidine HCl (ZANTAC PO) Take  by mouth as needed.         ALLERGIES  No Known Allergies    PHYSICAL EXAM  VITAL SIGNS: /71   Pulse 92   Temp 36.2 °C (97.2 °F) (Temporal)   Resp 18   SpO2 95%     Nursing note and vitals reviewed.  Constitutional: Well-developed and well-nourished. Tearful, agitated, anxious, dried blood behind left ear  HENT: Head is normocephalic. There seems to be a small, punctate laceration. Oropharynx is clear and moist without exudate or erythema.   Eyes: Pupils are equal, round, and reactive to light. Conjunctiva are normal.   Cardiovascular: Normal rate and regular rhythm. No murmur heard. Normal radial pulses.  Pulmonary/Chest: Breath sounds normal. No wheezes or rales.   Abdominal: Soft and non-tender. No distention    Musculoskeletal: Extremities exhibit normal range of motion without edema or tenderness.   Neurological: Does not cooperate with questioning. Apparently has no recollection of events this evening. Spontaneously moves all extremities. Talkative with nursing staff  Skin: Skin is warm and dry. No rash. There  seems to be a small, punctate laceration  Psychiatric: Patient is somewhat uncooperative with me. States that I look scary.      DIAGNOSTIC STUDIES / PROCEDURES      LABS  Results for orders placed or performed during the hospital encounter of 08/25/19   Blood Alcohol   Result Value Ref Range    Diagnostic Alcohol 0.26 (H) 0.00 g/dL   CBC WITH DIFFERENTIAL   Result Value Ref Range    WBC 4.6 (L) 4.8 - 10.8 K/uL    RBC 4.37 4.20 - 5.40 M/uL    Hemoglobin 14.4 12.0 - 16.0 g/dL    Hematocrit 41.9 37.0 - 47.0 %    MCV 95.9 81.4 - 97.8 fL    MCH 33.0 27.0 - 33.0 pg    MCHC 34.4 33.6 - 35.0 g/dL    RDW 43.1 35.9 - 50.0 fL    Platelet Count 173 164 - 446 K/uL    MPV 10.5 9.0 - 12.9 fL    Neutrophils-Polys 54.80 44.00 - 72.00 %    Lymphocytes 36.50 22.00 - 41.00 %    Monocytes 6.70 0.00 - 13.40 %    Eosinophils 1.10 0.00 - 6.90 %    Basophils 0.70 0.00 - 1.80 %    Immature Granulocytes 0.20 0.00 - 0.90 %    Nucleated RBC 0.00 /100 WBC    Neutrophils (Absolute) 2.52 2.00 - 7.15 K/uL    Lymphs (Absolute) 1.68 1.00 - 4.80 K/uL    Monos (Absolute) 0.31 0.00 - 0.85 K/uL    Eos (Absolute) 0.05 0.00 - 0.51 K/uL    Baso (Absolute) 0.03 0.00 - 0.12 K/uL    Immature Granulocytes (abs) 0.01 0.00 - 0.11 K/uL    NRBC (Absolute) 0.00 K/uL   COMP METABOLIC PANEL   Result Value Ref Range    Sodium 140 135 - 145 mmol/L    Potassium 3.7 3.6 - 5.5 mmol/L    Chloride 107 96 - 112 mmol/L    Co2 23 20 - 33 mmol/L    Anion Gap 10.0 0.0 - 11.9    Glucose 168 (H) 65 - 99 mg/dL    Bun 11 8 - 22 mg/dL    Creatinine 0.66 0.50 - 1.40 mg/dL    Calcium 9.0 8.5 - 10.5 mg/dL    AST(SGOT) 62 (H) 12 - 45 U/L    ALT(SGPT) 69 (H) 2 - 50 U/L    Alkaline Phosphatase 69 30 - 99 U/L    Total Bilirubin 0.4 0.1 - 1.5 mg/dL    Albumin 3.9 3.2 - 4.9 g/dL    Total Protein 7.8 6.0 - 8.2 g/dL    Globulin 3.9 (H) 1.9 - 3.5 g/dL    A-G Ratio 1.0 g/dL   ESTIMATED GFR   Result Value Ref Range    GFR If African American >60 >60 mL/min/1.73 m 2    GFR If Non African American  >60 >60 mL/min/1.73 m 2       All labs reviewed by me.    RADIOLOGY  CT-HEAD W/O   Final Result      Normal CT scan of the head without contrast.               INTERPRETING LOCATION:  [address] [place] [address], [address]        The radiologist's interpretation of all radiological studies have been reviewed by me.    COURSE & MEDICAL DECISION MAKING  Nursing notes, VS, PMSFHx reviewed in chart.       4:50 AM - Patient seen and examined at bedside. Ordered CT head without contrast, urine drug screen, blood alcohol, CBC with differential, and CMP to evaluate her symptoms. The differential diagnoses include but are not limited to: Alcohol intoxication, intracranial hemorrhage, metastatic disease, intellectual abnormality, drug use     5:30 AM Per nursing patient desaturates to 88% when sleeping. Saturation improved to 94% on 2L NC.    5:40 AM Review of laboratory results reveal a RICH of 0.26.    11:39 AM  On repeat evaluation, patient is resting comfortably.  Continues to await sobriety.  [facility] consultation is pending.  Care will be turned over to my partner at noon pending disposition.    Final disposition: Pending    IMPRESSION  1. Alcohol intoxication with delirium (HCC)    2. Traumatic injury of head, initial encounter    3. Aggressive behavior          Josr LUCAS (Scribe), am scribing for, and in the presence of, Jimi Hernandez M.D..    Electronically signed by: Josr Hyde (Scribe), 8/25/2019    Jimi LUCAS M.D. personally performed the services described in this documentation, as scribed by Josr Hyde in my presence, and it is both accurate and complete.    C    The note accurately reflects work and decisions made by me.  Jimi Hernandez  8/25/2019  11:39 AM

## 2019-08-25 NOTE — ED NOTES
Pt continues to destat while in deep sleep; Pt placed in upright position on oxymask at 6L. ERP notified.

## 2019-08-25 NOTE — ED NOTES
"Pt woke up and made threats to nursing staff stating \"when I get up I am going to choke you and you are going to die\". RN attempt at reorienting pt. Pt states \" I hate you, go fuck yourself\". Secuirty called and placed in room for staff safety   "

## 2019-08-25 NOTE — ED NOTES
Patient aroused to vocal stim;     AOx3, does not remember why she came in- states she fell. Denies suicidal ideation or previous suicidal attempts.
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
## 2019-08-25 NOTE — ED NOTES
Patient passed road test, amb  With steady gait without assistance.     Given clothes to go home in.    Cleared for DC per ER MD. DC instructions provided and patient verbalizes understanding. NAD noted. VSS. Patient amb with steady gait.
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
## 2019-09-04 ENCOUNTER — HOSPITAL ENCOUNTER (OUTPATIENT)
Dept: RADIOLOGY | Facility: MEDICAL CENTER | Age: 59
End: 2019-09-04
Attending: INTERNAL MEDICINE
Payer: MEDICAID

## 2019-09-04 DIAGNOSIS — M85.80 OTHER SPECIFIED DISORDERS OF BONE DENSITY AND STRUCTURE, UNSPECIFIED SITE: ICD-10-CM

## 2019-09-04 DIAGNOSIS — D05.81 INTRADUCTAL CARCINOMA AND LOBULAR CARCINOMA IN SITU, RIGHT: ICD-10-CM

## 2019-09-04 PROCEDURE — 73552 X-RAY EXAM OF FEMUR 2/>: CPT | Mod: RT

## 2019-11-14 ENCOUNTER — TELEPHONE (OUTPATIENT)
Dept: HEMATOLOGY ONCOLOGY | Facility: MEDICAL CENTER | Age: 59
End: 2019-11-14

## 2019-11-16 NOTE — PROGRESS NOTES
Phoned to review cancer treatment summary/ survivorship care plan.  Pt confirmed she received it in the mail. Reviewed the purpose and contents of survivorship care plan. Discussed healthy eating class. Pt states she has not yet scheduled her follow up with Dr. Chan, encouraged pt to call ASAP to get scheduled. She denied additional concerns or needs at this time. Scanned to [facility].     independent

## 2021-03-15 DIAGNOSIS — Z23 NEED FOR VACCINATION: ICD-10-CM

## 2021-07-14 ENCOUNTER — HOSPITAL ENCOUNTER (OUTPATIENT)
Dept: RADIOLOGY | Facility: MEDICAL CENTER | Age: 61
End: 2021-07-14
Attending: FAMILY MEDICINE
Payer: MEDICAID

## 2021-07-14 DIAGNOSIS — R76.8 HCV ANTIBODY POSITIVE: ICD-10-CM

## 2022-05-17 ENCOUNTER — HOSPITAL ENCOUNTER (OUTPATIENT)
Dept: RADIOLOGY | Facility: MEDICAL CENTER | Age: 62
End: 2022-05-17
Attending: NURSE PRACTITIONER
Payer: MEDICAID

## 2022-05-17 DIAGNOSIS — K76.9 LIVER DISEASE: ICD-10-CM

## 2023-04-17 ENCOUNTER — HOSPITAL ENCOUNTER (OUTPATIENT)
Dept: CARDIOLOGY | Facility: MEDICAL CENTER | Age: 63
End: 2023-04-17
Attending: INTERNAL MEDICINE
Payer: MEDICAID

## 2023-04-17 DIAGNOSIS — R01.1 HEART MURMUR: ICD-10-CM

## 2023-04-17 LAB
LV EJECT FRACT  99904: 65
LV EJECT FRACT MOD 2C 99903: 63.58
LV EJECT FRACT MOD 4C 99902: 64.35
LV EJECT FRACT MOD BP 99901: 61.83

## 2023-04-17 PROCEDURE — 93306 TTE W/DOPPLER COMPLETE: CPT | Mod: 26 | Performed by: INTERNAL MEDICINE

## 2023-04-17 PROCEDURE — 93306 TTE W/DOPPLER COMPLETE: CPT

## (undated) DEVICE — SUTURE 4-0 MONOCRYL PLUS PS-2 - 27 INCH (36/BX)

## (undated) DEVICE — SODIUM CHL IRRIGATION 0.9% 1000ML (12EA/CA)

## (undated) DEVICE — ELECTRODE DUAL RETURN W/ CORD - (50/PK)

## (undated) DEVICE — GLOVE SZ 7 BIOGEL PI MICRO - PF LF (50PR/BX 4BX/CA)

## (undated) DEVICE — DRESSING TRANSPARENT FILM TEGADERM 4 X 4.75" (50EA/BX)"

## (undated) DEVICE — SUCTION INSTRUMENT YANKAUER BULBOUS TIP W/O VENT (50EA/CA)

## (undated) DEVICE — SUTURE 3-0 VICRYL PLUS - 12 X 18 INCH (12/BX)

## (undated) DEVICE — SUTURE 3-0 VICRYL PLUS SH - 8X 18 INCH (12/BX)

## (undated) DEVICE — CANISTER SUCTION RIGID RED 1500CC (40EA/CA)

## (undated) DEVICE — DRAPE IOBAN II INCISE 23X17 - (10EA/BX 4BX/CA)

## (undated) DEVICE — GOWN SURGEONS LARGE - (32/CA)

## (undated) DEVICE — SUTURE GENERAL

## (undated) DEVICE — SLEEVE, VASO, THIGH, MED

## (undated) DEVICE — CATHETER IV 20 GA X 1-1/4 ---SURG.& SDS ONLY--- (50EA/BX)

## (undated) DEVICE — STAPLER SKIN DISP - (6/BX 10BX/CA) VISISTAT

## (undated) DEVICE — CANISTER SUCTION 3000ML MECHANICAL FILTER AUTO SHUTOFF MEDI-VAC NONSTERILE LF DISP  (40EA/CA)

## (undated) DEVICE — GOWN WARMING STANDARD FLEX - (30/CA)

## (undated) DEVICE — SUTURE 3-0 ETHILON FS-1 - (36/BX) 30 INCH

## (undated) DEVICE — STERI STRIP COMPOUND BENZOIN - TINCTURE 0.6ML WITH APPLICATOR (40EA/BX)

## (undated) DEVICE — GAUZE FLUFF STERILE 2-PLY 36 X 36 (100EA/CA)

## (undated) DEVICE — COVER PROBE STERILE CONE (12EA/CA)

## (undated) DEVICE — WATER IRRIGATION STERILE 1000ML (12EA/CA)

## (undated) DEVICE — SPONGE GAUZESTER. 2X2 4-PL - (2/PK 50PK/BX 30BX/CS)

## (undated) DEVICE — SWABSTICK BENZOIN SINGLE (50EA/BX)

## (undated) DEVICE — DRESSING TEGADERM 8 X 12 - (10/BX 8BX/CA)

## (undated) DEVICE — SHEET TRANSVERSE LAP - (12EA/CA)

## (undated) DEVICE — GLOVE SZ 7.5 BIOGEL PI MICRO - PF LF (50PR/BX)

## (undated) DEVICE — KIT ANESTHESIA W/CIRCUIT & 3/LT BAG W/FILTER (20EA/CA)

## (undated) DEVICE — ELECTRODE 850 FOAM ADHESIVE - HYDROGEL RADIOTRNSPRNT (50/PK)

## (undated) DEVICE — PROTECTOR ULNA NERVE - (36PR/CA)

## (undated) DEVICE — GLOVE BIOGEL SZ 7 SURGICAL PF LTX - (50PR/BX 4BX/CA)

## (undated) DEVICE — SENSOR SPO2 NEO LNCS ADHESIVE (20/BX) SEE USER NOTES

## (undated) DEVICE — PAD LAP STERILE 18 X 18 - (5/PK 40PK/CA)

## (undated) DEVICE — TUBING CLEARLINK DUO-VENT - C-FLO (48EA/CA)

## (undated) DEVICE — CLOSURE SKIN STRIP 1/2 X 4 IN - (STERI STRIP) (50/BX 4BX/CA)

## (undated) DEVICE — LACTATED RINGERS INJ 1000 ML - (14EA/CA 60CA/PF)

## (undated) DEVICE — TUBE CONNECTING SUCTION - CLEAR PLASTIC STERILE 72 IN (50EA/CA)

## (undated) DEVICE — BLADE SURGICAL #10 - (50/BX)

## (undated) DEVICE — SET LEADWIRE 5 LEAD BEDSIDE DISPOSABLE ECG (1SET OF 5/EA)

## (undated) DEVICE — BANDAGE ELASTIC 6 IN X 5 YDS - LATEX FREE (10/BX)

## (undated) DEVICE — SPONGE GAUZESTER 4 X 4 4PLY - (128PK/CA)

## (undated) DEVICE — GLOVE BIOGEL PI INDICATOR SZ 8.0 SURGICAL PF LF -(50/BX 4BX/CA)

## (undated) DEVICE — GLOVE BIOGEL INDICATOR SZ 7.5 SURGICAL PF LTX - (50PR/BX 4BX/CA)

## (undated) DEVICE — MASK ANESTHESIA ADULT  - (100/CA)

## (undated) DEVICE — KIT  I.V. START (100EA/CA)

## (undated) DEVICE — NEPTUNE 4 PORT MANIFOLD - (20/PK)

## (undated) DEVICE — HEAD HOLDER JUNIOR/ADULT

## (undated) DEVICE — CHLORAPREP 26 ML APPLICATOR - ORANGE TINT(25/CA)

## (undated) DEVICE — PACK MINOR BASIN - (2EA/CA)

## (undated) DEVICE — DRAPE LARGE 3 QUARTER - (20/CA)